# Patient Record
Sex: FEMALE | Race: WHITE | Employment: FULL TIME | URBAN - METROPOLITAN AREA
[De-identification: names, ages, dates, MRNs, and addresses within clinical notes are randomized per-mention and may not be internally consistent; named-entity substitution may affect disease eponyms.]

---

## 2018-03-23 ENCOUNTER — HOSPITAL ENCOUNTER (OUTPATIENT)
Age: 48
Discharge: HOME | End: 2018-03-25

## 2018-03-23 PROCEDURE — 87340 HEPATITIS B SURFACE AG IA: CPT

## 2018-03-23 PROCEDURE — 86706 HEP B SURFACE ANTIBODY: CPT

## 2018-03-26 LAB
HBV SURFACE AB TITR SER: NORMAL {TITER}
HEPATITIS B SURFACE ANTIGEN INTERPRETATION: NORMAL

## 2018-10-16 ENCOUNTER — APPOINTMENT (OUTPATIENT)
Dept: CT IMAGING | Age: 48
DRG: 066 | End: 2018-10-16
Payer: COMMERCIAL

## 2018-10-16 ENCOUNTER — HOSPITAL ENCOUNTER (INPATIENT)
Age: 48
LOS: 3 days | Discharge: HOME HEALTH | DRG: 066 | End: 2018-10-20
Attending: EMERGENCY MEDICINE | Admitting: INTERNAL MEDICINE
Payer: COMMERCIAL

## 2018-10-16 DIAGNOSIS — R27.0 ATAXIA: Primary | ICD-10-CM

## 2018-10-16 DIAGNOSIS — R42 DIZZINESS: ICD-10-CM

## 2018-10-16 LAB
ANION GAP SERPL CALCULATED.3IONS-SCNC: 11 MMOL/L (ref 7–16)
APTT: 36.2 SEC (ref 24.5–35.1)
BACTERIA: ABNORMAL /HPF
BASOPHILS ABSOLUTE: 0.06 E9/L (ref 0–0.2)
BASOPHILS RELATIVE PERCENT: 0.6 % (ref 0–2)
BILIRUBIN URINE: NEGATIVE
BLOOD, URINE: NEGATIVE
BUN BLDV-MCNC: 13 MG/DL (ref 6–20)
CALCIUM SERPL-MCNC: 9.4 MG/DL (ref 8.6–10.2)
CHLORIDE BLD-SCNC: 101 MMOL/L (ref 98–107)
CLARITY: CLEAR
CO2: 25 MMOL/L (ref 22–29)
COLOR: YELLOW
CREAT SERPL-MCNC: 0.8 MG/DL (ref 0.5–1)
EKG ATRIAL RATE: 92 BPM
EKG P AXIS: 43 DEGREES
EKG P-R INTERVAL: 148 MS
EKG Q-T INTERVAL: 330 MS
EKG QRS DURATION: 64 MS
EKG QTC CALCULATION (BAZETT): 408 MS
EKG R AXIS: 29 DEGREES
EKG T AXIS: 37 DEGREES
EKG VENTRICULAR RATE: 92 BPM
EOSINOPHILS ABSOLUTE: 0.08 E9/L (ref 0.05–0.5)
EOSINOPHILS RELATIVE PERCENT: 0.9 % (ref 0–6)
EPITHELIAL CELLS, UA: ABNORMAL /HPF
GFR AFRICAN AMERICAN: >60
GFR NON-AFRICAN AMERICAN: >60 ML/MIN/1.73
GLUCOSE BLD-MCNC: 91 MG/DL (ref 74–109)
GLUCOSE URINE: NEGATIVE MG/DL
HCT VFR BLD CALC: 42.2 % (ref 34–48)
HEMOGLOBIN: 13.6 G/DL (ref 11.5–15.5)
IMMATURE GRANULOCYTES #: 0.03 E9/L
IMMATURE GRANULOCYTES %: 0.3 % (ref 0–5)
INR BLD: 0.9
KETONES, URINE: NEGATIVE MG/DL
LEUKOCYTE ESTERASE, URINE: ABNORMAL
LYMPHOCYTES ABSOLUTE: 2.6 E9/L (ref 1.5–4)
LYMPHOCYTES RELATIVE PERCENT: 27.7 % (ref 20–42)
MCH RBC QN AUTO: 29.6 PG (ref 26–35)
MCHC RBC AUTO-ENTMCNC: 32.2 % (ref 32–34.5)
MCV RBC AUTO: 91.9 FL (ref 80–99.9)
MONOCYTES ABSOLUTE: 0.61 E9/L (ref 0.1–0.95)
MONOCYTES RELATIVE PERCENT: 6.5 % (ref 2–12)
NEUTROPHILS ABSOLUTE: 6.01 E9/L (ref 1.8–7.3)
NEUTROPHILS RELATIVE PERCENT: 64 % (ref 43–80)
NITRITE, URINE: NEGATIVE
PDW BLD-RTO: 12.8 FL (ref 11.5–15)
PH UA: 5.5 (ref 5–9)
PLATELET # BLD: 356 E9/L (ref 130–450)
PMV BLD AUTO: 9.1 FL (ref 7–12)
POTASSIUM SERPL-SCNC: 4.3 MMOL/L (ref 3.5–5)
PROTEIN UA: NEGATIVE MG/DL
PROTHROMBIN TIME: 10.5 SEC (ref 9.3–12.4)
RBC # BLD: 4.59 E12/L (ref 3.5–5.5)
RBC UA: ABNORMAL /HPF (ref 0–2)
SODIUM BLD-SCNC: 137 MMOL/L (ref 132–146)
SPECIFIC GRAVITY UA: 1.02 (ref 1–1.03)
TROPONIN: <0.01 NG/ML (ref 0–0.03)
UROBILINOGEN, URINE: 0.2 E.U./DL
WBC # BLD: 9.4 E9/L (ref 4.5–11.5)
WBC UA: ABNORMAL /HPF (ref 0–5)

## 2018-10-16 PROCEDURE — 99285 EMERGENCY DEPT VISIT HI MDM: CPT

## 2018-10-16 PROCEDURE — 36415 COLL VENOUS BLD VENIPUNCTURE: CPT

## 2018-10-16 PROCEDURE — 70450 CT HEAD/BRAIN W/O DYE: CPT

## 2018-10-16 PROCEDURE — 81001 URINALYSIS AUTO W/SCOPE: CPT

## 2018-10-16 PROCEDURE — 6370000000 HC RX 637 (ALT 250 FOR IP): Performed by: EMERGENCY MEDICINE

## 2018-10-16 PROCEDURE — G0378 HOSPITAL OBSERVATION PER HR: HCPCS

## 2018-10-16 PROCEDURE — 84484 ASSAY OF TROPONIN QUANT: CPT

## 2018-10-16 PROCEDURE — 85730 THROMBOPLASTIN TIME PARTIAL: CPT

## 2018-10-16 PROCEDURE — 85025 COMPLETE CBC W/AUTO DIFF WBC: CPT

## 2018-10-16 PROCEDURE — 80048 BASIC METABOLIC PNL TOTAL CA: CPT

## 2018-10-16 PROCEDURE — 85610 PROTHROMBIN TIME: CPT

## 2018-10-16 RX ORDER — MECLIZINE HYDROCHLORIDE 25 MG/1
25 TABLET ORAL 3 TIMES DAILY PRN
COMMUNITY

## 2018-10-16 RX ORDER — SODIUM CHLORIDE 0.9 % (FLUSH) 0.9 %
10 SYRINGE (ML) INJECTION EVERY 12 HOURS SCHEDULED
Status: DISCONTINUED | OUTPATIENT
Start: 2018-10-17 | End: 2018-10-20 | Stop reason: HOSPADM

## 2018-10-16 RX ORDER — PREGABALIN 100 MG/1
100 CAPSULE ORAL 3 TIMES DAILY
COMMUNITY
End: 2021-03-08

## 2018-10-16 RX ORDER — MECLIZINE HCL 12.5 MG/1
50 TABLET ORAL ONCE
Status: COMPLETED | OUTPATIENT
Start: 2018-10-16 | End: 2018-10-16

## 2018-10-16 RX ORDER — ACETAMINOPHEN 325 MG/1
650 TABLET ORAL EVERY 4 HOURS PRN
Status: DISCONTINUED | OUTPATIENT
Start: 2018-10-16 | End: 2018-10-20 | Stop reason: HOSPADM

## 2018-10-16 RX ORDER — SODIUM CHLORIDE 0.9 % (FLUSH) 0.9 %
10 SYRINGE (ML) INJECTION EVERY 12 HOURS SCHEDULED
Status: DISCONTINUED | OUTPATIENT
Start: 2018-10-16 | End: 2018-10-16

## 2018-10-16 RX ORDER — ONDANSETRON 2 MG/ML
4 INJECTION INTRAMUSCULAR; INTRAVENOUS EVERY 6 HOURS PRN
Status: DISCONTINUED | OUTPATIENT
Start: 2018-10-16 | End: 2018-10-20 | Stop reason: HOSPADM

## 2018-10-16 RX ORDER — SODIUM CHLORIDE 0.9 % (FLUSH) 0.9 %
10 SYRINGE (ML) INJECTION PRN
Status: DISCONTINUED | OUTPATIENT
Start: 2018-10-16 | End: 2018-10-16

## 2018-10-16 RX ORDER — SODIUM CHLORIDE 0.9 % (FLUSH) 0.9 %
10 SYRINGE (ML) INJECTION PRN
Status: DISCONTINUED | OUTPATIENT
Start: 2018-10-16 | End: 2018-10-20 | Stop reason: HOSPADM

## 2018-10-16 RX ORDER — PREGABALIN 100 MG/1
100 CAPSULE ORAL 3 TIMES DAILY
Status: DISCONTINUED | OUTPATIENT
Start: 2018-10-17 | End: 2018-10-20 | Stop reason: HOSPADM

## 2018-10-16 RX ORDER — MECLIZINE HCL 12.5 MG/1
25 TABLET ORAL 3 TIMES DAILY PRN
Status: DISCONTINUED | OUTPATIENT
Start: 2018-10-16 | End: 2018-10-20 | Stop reason: HOSPADM

## 2018-10-16 RX ADMIN — MECLIZINE 50 MG: 12.5 TABLET ORAL at 17:59

## 2018-10-16 ASSESSMENT — ENCOUNTER SYMPTOMS
NAUSEA: 0
FACIAL SWELLING: 0
COUGH: 0
SHORTNESS OF BREATH: 0
ABDOMINAL DISTENTION: 0
ABDOMINAL PAIN: 0
WHEEZING: 0
SORE THROAT: 0
DIARRHEA: 0
CHEST TIGHTNESS: 0
VOMITING: 0
PHOTOPHOBIA: 0
SINUS PRESSURE: 0
BACK PAIN: 0

## 2018-10-16 NOTE — ED PROVIDER NOTES
Chief complaint:  Dizzy, gait problem    HPI History provided by the patient and family  Patient has had about a week of gait difficulty, feeling off balance with some stumbling. Feels dizzy. No lightheadedness or syncope. No headache. Has chronic right facial numbness and weakness from a brain tumor and surgery that she had years ago. No particular headache now. No chest pain, palpitations or shortness of breath. States that she was seen and worked up at [facility] for the syncopal days ago including CT head that was negative however her symptoms continue to worsen. She does state that she gets increasing symptoms with rapid position changes, turning and head movements. Denies any new extremity numbness, tingle, paresthesias or weakness. She apparently has had some intermittent speech difficulty for the last week but nothing persistent. Has chronic right facial droop which is unchanged. No treatment prior to arrival. Apparently has had some mild viral symptoms with diarrhea and congestion for the last week as well. Review of Systems   Constitutional: Negative for chills, diaphoresis, fatigue and fever. HENT: Negative for congestion, ear discharge, ear pain, facial swelling, sinus pressure and sore throat. Eyes: Negative for photophobia and visual disturbance. Respiratory: Negative for cough, chest tightness, shortness of breath and wheezing. Cardiovascular: Negative for chest pain, palpitations and leg swelling. Gastrointestinal: Negative for abdominal distention, abdominal pain, diarrhea, nausea and vomiting. Genitourinary: Negative for dysuria, flank pain and frequency. Musculoskeletal: Positive for gait problem. Negative for arthralgias, back pain, joint swelling, myalgias, neck pain and neck stiffness. Skin: Negative for rash and wound. Neurological: Positive for dizziness, facial asymmetry, speech difficulty and numbness.  Negative for tremors, seizures, syncope, weakness, activity. Gait abnormal. Coordination normal. GCS eye subscore is 4. GCS verbal subscore is 5. GCS motor subscore is 6. Unsteady, short, shuffling gait. Right facial droop with diminished sensation to the right face noted. Arms and legs otherwise with no focal neurologic deficit. Patient oriented ×3. Speech intermittently slightly slurred during the exam. Otherwise all neurologic deficits appear chronic per the family other than the difficulty ambulating. Skin: Skin is warm and dry. She is not diaphoretic. Nursing note and vitals reviewed. Procedures    MDM    ED Course as of Oct 16 1823   Tue Oct 16, 2018   1816 Case discussed with Dr. Karl Nogueira, detailed overview given, he will admit the patient if we have neurology coverage at [address] [place] [address] 300 this week. If not he recommends transferring the patient. [NC]      ED Course User Index  [NC] Elizabeth Dai DO     EKG Interpretation    Interpreted by emergency department physician    Rhythm: normal sinus   Rate: 92  Axis: normal  Ectopy: none  Conduction: normal  ST Segments: nonspecific changes  T Waves: no acute change  Q Waves: none    Clinical Impression: no acute changes    Elizabeth Dai      ED Course as of Oct 16 1823   Tue Oct 16, 2018   1816 Case discussed with Dr. Karl Nogueira, detailed overview given, he will admit the patient if we have neurology coverage at [address] [place] [address],[facility] 300 this week. If not he recommends transferring the patient. [NC]      ED Course User Index  [NC] Elizabeth Dai DO       --------------------------------------------- PAST HISTORY ---------------------------------------------  Past Medical History:  has a past medical history of Fibromyalgia. Past Surgical History:  has a past surgical history that includes brain surgery. Social History:      Family History: family history is not on file. The patients home medications have been reviewed.     Allergies: Patient has no allergy information on

## 2018-10-17 PROCEDURE — 97161 PT EVAL LOW COMPLEX 20 MIN: CPT | Performed by: PHYSICAL THERAPIST

## 2018-10-17 PROCEDURE — 97530 THERAPEUTIC ACTIVITIES: CPT

## 2018-10-17 PROCEDURE — 96372 THER/PROPH/DIAG INJ SC/IM: CPT

## 2018-10-17 PROCEDURE — G8988 SELF CARE GOAL STATUS: HCPCS

## 2018-10-17 PROCEDURE — 97165 OT EVAL LOW COMPLEX 30 MIN: CPT

## 2018-10-17 PROCEDURE — G0378 HOSPITAL OBSERVATION PER HR: HCPCS

## 2018-10-17 PROCEDURE — G8979 MOBILITY GOAL STATUS: HCPCS | Performed by: PHYSICAL THERAPIST

## 2018-10-17 PROCEDURE — 97535 SELF CARE MNGMENT TRAINING: CPT

## 2018-10-17 PROCEDURE — 97530 THERAPEUTIC ACTIVITIES: CPT | Performed by: PHYSICAL THERAPIST

## 2018-10-17 PROCEDURE — 6360000002 HC RX W HCPCS: Performed by: INTERNAL MEDICINE

## 2018-10-17 PROCEDURE — G8978 MOBILITY CURRENT STATUS: HCPCS | Performed by: PHYSICAL THERAPIST

## 2018-10-17 PROCEDURE — G8987 SELF CARE CURRENT STATUS: HCPCS

## 2018-10-17 PROCEDURE — 2580000003 HC RX 258: Performed by: INTERNAL MEDICINE

## 2018-10-17 PROCEDURE — 6370000000 HC RX 637 (ALT 250 FOR IP): Performed by: INTERNAL MEDICINE

## 2018-10-17 RX ADMIN — Medication 10 ML: at 10:46

## 2018-10-17 RX ADMIN — PREGABALIN 100 MG: 100 CAPSULE ORAL at 22:23

## 2018-10-17 RX ADMIN — PREGABALIN 100 MG: 100 CAPSULE ORAL at 15:02

## 2018-10-17 RX ADMIN — PREGABALIN 100 MG: 100 CAPSULE ORAL at 10:45

## 2018-10-17 RX ADMIN — Medication 10 ML: at 00:38

## 2018-10-17 RX ADMIN — Medication 10 ML: at 22:22

## 2018-10-17 RX ADMIN — PREGABALIN 100 MG: 100 CAPSULE ORAL at 00:37

## 2018-10-17 RX ADMIN — ENOXAPARIN SODIUM 40 MG: 40 INJECTION SUBCUTANEOUS at 10:45

## 2018-10-17 ASSESSMENT — PAIN SCALES - GENERAL
PAINLEVEL_OUTOF10: 0
PAINLEVEL_OUTOF10: 0

## 2018-10-17 NOTE — CARE COORDINATION
SS Note/Discharge plan:  Met with pt regarding her transition of care needs and reviewed therapy recommendations for acute rehab placement, pt currently a&o and pleasant, pt relays no past rehab placements, pt has [employer], acute rehab choices discussed and pt prefers [facility], referral made to [facility] admissions liaison and transfer pending acceptance and insurance authorization, sw will follow.  Electronically signed by HEYDI Izquierdo on 10/17/2018 at 2:07 PM

## 2018-10-17 NOTE — H&P
activity: Not on file     Other Topics Concern    Not on file     Social History Narrative    No narrative on file     Prior to Admission medications    Medication Sig Start Date End Date Taking? Authorizing Provider   pregabalin (LYRICA) 100 MG capsule Take 100 mg by mouth 3 times daily. .   Yes Historical Provider, MD   meclizine (ANTIVERT) 25 MG tablet Take 25 mg by mouth 3 times daily as needed (vertigo)   Yes Historical Provider, MD     Allergies: Bactrim [sulfamethoxazole-trimethoprim]    Review of Systems  All bolded are positive; please see HPI  General:  Fever, chills, diaphoresis, fatigue, malaise, night sweats, weight loss  Psychological:  Anxiety, disorientation, hallucinations. ENT:  Epistaxis, vertigo, visual changes. Cardiovascular:  Chest pain, irregular heartbeats, palpitations, paroxysmal nocturnal dyspnea. Respiratory:  Shortness of breath, coughing, sputum production, hemoptysis, wheezing, orthopnea.   Gastrointestinal:  Nausea, vomiting, diarrhea, heartburn, constipation, abdominal pain, hematemesis, hematochezia, melena, acholic stools  Genito-Urinary:  Dysuria, urgency, frequency, hematuria  Musculoskeletal:  Joint pain, joint stiffness, joint swelling, muscle pain  Neurology:  Headache, focal neurological deficits - chronic right-sided facial droop and numbness, weakness, numbness, paresthesia  Derm:  Rashes, ulcers, excoriations, bruising  Extremities:  Decreased ROM, peripheral edema, mottling    Physical Examination  BP (!) 142/84   Pulse 88   Temp 98.3 °F (36.8 °C) (Oral)   Resp 16   Wt 238 lb (108 kg)   SpO2 95%   General: patient is awake, alert, and fully oriented; they appear stated age and are cooperative during the examination; they are in no apparent distress and do not exhibit any labored breathing at this time  HEENT: Right-sided facial droop and decreased in sensation  Neck: supple with trachea midline and without obvious JVD or bruit  Cardiac: regular rate and rhythm

## 2018-10-18 ENCOUNTER — APPOINTMENT (OUTPATIENT)
Dept: MRI IMAGING | Age: 48
DRG: 066 | End: 2018-10-18
Payer: COMMERCIAL

## 2018-10-18 PROCEDURE — 97530 THERAPEUTIC ACTIVITIES: CPT

## 2018-10-18 PROCEDURE — 97535 SELF CARE MNGMENT TRAINING: CPT

## 2018-10-18 PROCEDURE — 70553 MRI BRAIN STEM W/O & W/DYE: CPT

## 2018-10-18 PROCEDURE — 2580000003 HC RX 258: Performed by: INTERNAL MEDICINE

## 2018-10-18 PROCEDURE — 97116 GAIT TRAINING THERAPY: CPT

## 2018-10-18 PROCEDURE — 1200000000 HC SEMI PRIVATE

## 2018-10-18 PROCEDURE — 6370000000 HC RX 637 (ALT 250 FOR IP): Performed by: INTERNAL MEDICINE

## 2018-10-18 PROCEDURE — G0378 HOSPITAL OBSERVATION PER HR: HCPCS

## 2018-10-18 PROCEDURE — 96372 THER/PROPH/DIAG INJ SC/IM: CPT

## 2018-10-18 PROCEDURE — A9577 INJ MULTIHANCE: HCPCS | Performed by: RADIOLOGY

## 2018-10-18 PROCEDURE — 6360000002 HC RX W HCPCS: Performed by: INTERNAL MEDICINE

## 2018-10-18 PROCEDURE — 6360000004 HC RX CONTRAST MEDICATION: Performed by: RADIOLOGY

## 2018-10-18 RX ORDER — CLOPIDOGREL BISULFATE 75 MG/1
75 TABLET ORAL DAILY
Status: DISCONTINUED | OUTPATIENT
Start: 2018-10-18 | End: 2018-10-20 | Stop reason: HOSPADM

## 2018-10-18 RX ADMIN — Medication 10 ML: at 21:04

## 2018-10-18 RX ADMIN — PREGABALIN 100 MG: 100 CAPSULE ORAL at 21:03

## 2018-10-18 RX ADMIN — CLOPIDOGREL BISULFATE 75 MG: 75 TABLET ORAL at 15:54

## 2018-10-18 RX ADMIN — PREGABALIN 100 MG: 100 CAPSULE ORAL at 08:52

## 2018-10-18 RX ADMIN — Medication 10 ML: at 08:52

## 2018-10-18 RX ADMIN — GADOBENATE DIMEGLUMINE 20 ML: 529 INJECTION, SOLUTION INTRAVENOUS at 13:39

## 2018-10-18 RX ADMIN — PREGABALIN 100 MG: 100 CAPSULE ORAL at 13:56

## 2018-10-18 RX ADMIN — ENOXAPARIN SODIUM 40 MG: 40 INJECTION SUBCUTANEOUS at 08:52

## 2018-10-18 ASSESSMENT — PAIN SCALES - GENERAL
PAINLEVEL_OUTOF10: 0

## 2018-10-18 NOTE — PROGRESS NOTES
Occupational Therapy  O.T. Bedside Treatment Note    Date:10/18/2018  Patient Name: Florencia Iqbal  MRN: [id number]  : 1970  ROOM #: [id number]    Problem list / Diagnosis:   Patient Active Problem List   Diagnosis    Ataxia    [facility] tumor ([facility]    Fibromyalgia     Past Medical History:   Past Medical History:   Diagnosis Date    [facility] tumor ([facility] [address] [facility]     23years old-not cancerous     Fibromyalgia      Onset/Medical history: medical history reviewed  Past medical history: reviewed    The admitting diagnosis and active problem list as listed above have been reviewed prior to treatment. Nursing cleared the patient for treatment. Patient is agreeable to treatment. Discharge recommendations: Acute vs. HHOT with supervision/assist; followed by OP OT (pt may benefit from vision and speech therapy)   Equipment prescriptions needed: TBD     AM - [facility] Daily Activity Inpatient     AM-PAC Daily Activity Inpatient   How much help for putting on and taking off regular lower body clothing?: A Little  How much help for Bathing?: A Little  How much help for Toileting?: A Little  How much help for putting on and taking off regular upper body clothing?: A Little  How much help for taking care of personal grooming?: A Little  How much help for eating meals?: None  AM-[facility] Inpatient Daily Activity Raw Score: 19  AM-[facility] Inpatient ADL T-Scale Score : 40.22  ADL Inpatient CMS 0-100% Score: 42.8  ADL Inpatient CMS G-Code Modifier : CK    Precautions: falls, hx of brain tumor, Fibromyalgia, decreased fine motor coordination     S:  - Pt cleared for treatment through nursing.  - Pain: pt had no c/o pain at this time. - Pt c/o \"blurry vision\"; pt also states her \"speech and voice sounds different\"; pt c/o weakness on R side of body. - Pt pleasant and cooperative during session.     O:    Function Assessment     Status  Goal Comment   Feeding:  Independent   Independent   Per last report   Grooming:  Minimal Assist at sink level Independent   While standing sinkside to wash hands after toileting; pt able to manage containers and complete oral hygiene while standing sinkside; assist for standing balance while combing hair at sinkside    UE dressing:  Minimal Assist  Independent  Per last report   LE dressing:  Minimal Assist  Independent  Pt able to don/doff socks while seated EOB; pt crossed BLE's during LE dressing; pt required increased time to don R sock d/t weakness    Bathing:  Minimal Assist  Independent  Per last report   Bed Mobility:       Supervision  for supine to sit,   Supervision  for scooting,  Supervision  for sit to supine  Independent  Pt able to bring UB and LE's to/from EOB   Functional Transfers:    Minimal Assist for sit to stand transfer from EOB; transfer to/from toilet at supervision; transfer to EOB at supervision with no AD Independent  Safety: fair   Cuing for hand placement   Functional Mobility: 20 feet x 2 with hand held assist/Minimal Assist  Independent  Unsteadiness noted; pt may benefit from use of FWW      - BUE digit AROM exercises: 10 reps in digit flexion/extension, abduction/adduction and digit opposition. [facility]. Fair activity tolerance noted during ex's. - Fine motor activity: pt able to use R hand to print and write name; pt states that her handwriting is similar prior to session. Pt also able to read a few sentences in book without any difficulty with reading, although c/o blurriness in vision.     A:  -Balance: Sitting: fair EOB        Standing: fair/fair - with Min A with HHA; unsteadiness noted  -Endurance: fair - (2* to decreased endurance, strength, fatigue)  -Edema: none  -Safety: fair (VC's for  safety, sequencing, hand placement)  - Pt/family education/training: bed mobility, transfer training, functional mobility,  LE dressing,   toileting/hygiene, sequencing, hand placement, safety, BUE digit ROM ex's, ECT's, grooming, ADL retraining, fine motor activities  -Pt would benefit from additional ADLs, safety education, balance activities, transfer training, strength exercises, and over all endurance building.     P:  - Plan of care: Patient will be seen by OT 1-3x/wk for therapeutic activity, ADL re-training, bed mobility,functional transfers, functional mobility, safety and fall prevention, balance and endurance activities, instruction and training in energy conservation principles, and   patient and family education.   - Patient and/or family understands diagnosis, prognosis and plan of care: yes     Treatment minutes: [address], [address] Elizabeth [address]

## 2018-10-18 NOTE — PROGRESS NOTES
Patient seen and examined. Patient still has some ataxia but her strength is improving. The case was discussed with physical therapist at the bedside. No complaints of unusual SOB, nausea, vomiting, chest pain,abd. pain, dizziness, diarrhea or constipation.     /70   Pulse 78   Temp 98 °F (36.7 °C) (Oral)   Resp 18   Wt 238 lb (108 kg)   SpO2 98%     HEENT: negative to gross visual examination  Heart: regular rate and rhythm  Lungs: clear  Abdomen: soft, positive bowel sounds, + obese, no hepatosplenomegaly, no guarding, rebound, rigidity  Ext: no clubbing, cyanosis, erythema, edema  Neuro: alert and oriented X 3, + ataxia    CBC with Differential:    Lab Results   Component Value Date    WBC 9.4 10/16/2018    RBC 4.59 10/16/2018    HGB 13.6 10/16/2018    HCT 42.2 10/16/2018     10/16/2018    MCV 91.9 10/16/2018    MCH 29.6 10/16/2018    MCHC 32.2 10/16/2018    RDW 12.8 10/16/2018    LYMPHOPCT 27.7 10/16/2018    MONOPCT 6.5 10/16/2018    BASOPCT 0.6 10/16/2018    MONOSABS 0.61 10/16/2018    LYMPHSABS 2.60 10/16/2018    EOSABS 0.08 10/16/2018    BASOSABS 0.06 10/16/2018     CMP:    Lab Results   Component Value Date     10/16/2018    K 4.3 10/16/2018     10/16/2018    CO2 25 10/16/2018    BUN 13 10/16/2018    CREATININE 0.8 10/16/2018    GFRAA >60 10/16/2018    LABGLOM >60 10/16/2018    GLUCOSE 91 10/16/2018    GLUCOSE 107 05/03/2012    PROT 7.7 04/01/2017    LABALBU 4.5 04/01/2017    LABALBU 4.7 05/03/2012    CALCIUM 9.4 10/16/2018    BILITOT 0.4 04/01/2017    ALKPHOS 86 04/01/2017    AST 21 04/01/2017    ALT 32 04/01/2017     Magnesium:  No results found for: MG  Phosphorus:  No results found for: PHOS  PT/INR:    Lab Results   Component Value Date    PROTIME 10.5 10/16/2018    INR 0.9 10/16/2018     Troponin:    Lab Results   Component Value Date    TROPONINI <0.01 10/16/2018     U/A:    Lab Results   Component Value Date    COLORU Yellow 10/16/2018    PROTEINU Negative 10/16/2018 PHUR 5.5 10/16/2018    WBCUA 1-3 10/16/2018    RBCUA NONE 10/16/2018    BACTERIA FEW 10/16/2018    CLARITYU Clear 10/16/2018    SPECGRAV 1.025 10/16/2018    LEUKOCYTESUR SMALL 10/16/2018    UROBILINOGEN 0.2 10/16/2018    BILIRUBINUR Negative 10/16/2018    BLOODU Negative 10/16/2018    GLUCOSEU Negative 10/16/2018     HgBA1c:  No results found for: LABA1C  FLP:    Lab Results   Component Value Date    TRIG 105 04/01/2017    HDL 50 04/01/2017    LDLCALC 123 04/01/2017    LABVLDL 21 04/01/2017     TSH:    Lab Results   Component Value Date    TSH 2.290 04/01/2017     LIPASE:  No results found for: LIPASE    No results for input(s): GLUMET in the last 72 hours. CT Head WO Contrast   Final Result   1. No acute intracranial hemorrhage or mass effect. 2. Postsurgical changes from prior right frontoparietal temporal   craniotomy with area of encephalomalacia within the right anterior   temporal lobe. MRI Brain W Contrast    (Results Pending)        pregabalin  100 mg Oral TID    sodium chloride flush  10 mL Intravenous 2 times per day    enoxaparin  40 mg Subcutaneous Daily        Assessment; Active Problems:    Ataxia    Brain tumor - noncancerous about 1995    Fibromyalgia      Plan:  Records from [facility] were reviewed  MRI of the head with contrast today  Neurology has not seen the patient yet    See orders.         Ezequiel Luevano DO  11:37 AM  10/18/2018

## 2018-10-18 NOTE — PROGRESS NOTES
Physical Therapy  Physical Therapy  Daily Treatment Note  10/18/2018  [id number]                      Candie Ortiz   [id number]                              1970    Patient Active Problem List   Diagnosis    Ataxia    Brain tumor ([facility] [address]    Fibromyalgia       Recommendation for discharge: Acute for balance  Equipment prescriptions needed:to be determined    AM-[facility] Mobility Inpatient   How much difficulty turning over in bed?: None  How much difficulty sitting down on / standing up from a chair with arms?: A Little  How much difficulty moving from lying on back to sitting on side of bed?: None  How much help from another person moving to and from a bed to a chair?: A Little  How much help from another person needed to walk in hospital room?: A Little  How much help from another person for climbing 3-5 steps with a railing?: A Little  AM-[facility] Inpatient Mobility Raw Score : 20  AM-[facility] Inpatient T-Scale Score : 47.67  Mobility Inpatient CMS 0-100% Score: 35.83  Mobility Inpatient CMS G-Code Modifier : CJ      Precautions: falls,ataxia, speak in her L ear, vision deficits per patient    S: Patient cleared by nursing for treatment. Patient is agreeable to treatment. Pain status: (measured on a visual analog scale with 0=no pain and 10=excruciating pain) 0/10. O: Pt was instructed in and performed the following:   Bed Mobility- Supine to sit-Supervision     Scooting- Supervision     Sit to supine-Supervision   Transfers-sit to stand- Minimal assists x 1 for balance     Gait:  Patient ambulated 140 feet x 2 using HHA and environmental support with the other hand with Minimal assists x 1. V/C for maintaining mid-line during ambulation, decreased speed, pacing, and safety. Steps: Pt ascended/descended 14 steps using 2 hand rails with Supervision. V/C for sequencing and safety. Treatment: Pt ambulated in the hallway and performed stair training. Comment: Call light left by patient.   RTB at end of tx session. A: Pt displays an ataxic gait; unsteady at times t/o ambulation and holding onto all available environmental support (hand rails in the hallway, foot board of the bed, walls, door frame) with the other hand. Pt able to perform stairs using 2 hand rails with no LOB or unsteadiness noted. P: Continue with physical therapy   TRAM JOHNSTON, PTA   Goals to be met in 3 days. Bed mobility-Independent  Transfers-Independent  Ambulation-Independent for 75 feet using  supervision   Stairs-up and down 5  step(s) using 1 rail(s) with Supervision   Comments:       Increase strength in affected muscle groups by 1/3 grade  Increase balance to allow for improvement towards functional goals. Increase endurance to allow for improvement towards functional goals.

## 2018-10-19 ENCOUNTER — APPOINTMENT (OUTPATIENT)
Dept: CT IMAGING | Age: 48
DRG: 066 | End: 2018-10-19
Payer: COMMERCIAL

## 2018-10-19 PROBLEM — I63.9 ACUTE CEREBROVASCULAR ACCIDENT (HCC): Status: ACTIVE | Noted: 2018-10-19

## 2018-10-19 LAB
ALBUMIN SERPL-MCNC: 4 G/DL (ref 3.5–5.2)
ALP BLD-CCNC: 96 U/L (ref 35–104)
ALT SERPL-CCNC: 32 U/L (ref 0–32)
ANION GAP SERPL CALCULATED.3IONS-SCNC: 12 MMOL/L (ref 7–16)
AST SERPL-CCNC: 14 U/L (ref 0–31)
BILIRUB SERPL-MCNC: 0.3 MG/DL (ref 0–1.2)
BUN BLDV-MCNC: 9 MG/DL (ref 6–20)
CALCIUM SERPL-MCNC: 9.1 MG/DL (ref 8.6–10.2)
CHLORIDE BLD-SCNC: 102 MMOL/L (ref 98–107)
CHOLESTEROL, TOTAL: 192 MG/DL (ref 0–199)
CO2: 25 MMOL/L (ref 22–29)
CREAT SERPL-MCNC: 0.7 MG/DL (ref 0.5–1)
GFR AFRICAN AMERICAN: >60
GFR NON-AFRICAN AMERICAN: >60 ML/MIN/1.73
GLUCOSE BLD-MCNC: 119 MG/DL (ref 74–109)
HDLC SERPL-MCNC: 43 MG/DL
LDL CHOLESTEROL CALCULATED: 112 MG/DL (ref 0–99)
POTASSIUM SERPL-SCNC: 3.7 MMOL/L (ref 3.5–5)
SODIUM BLD-SCNC: 139 MMOL/L (ref 132–146)
TOTAL PROTEIN: 7.5 G/DL (ref 6.4–8.3)
TRIGL SERPL-MCNC: 184 MG/DL (ref 0–149)
TSH SERPL DL<=0.05 MIU/L-ACNC: 2.17 UIU/ML (ref 0.27–4.2)
VLDLC SERPL CALC-MCNC: 37 MG/DL

## 2018-10-19 PROCEDURE — 6370000000 HC RX 637 (ALT 250 FOR IP): Performed by: INTERNAL MEDICINE

## 2018-10-19 PROCEDURE — 36415 COLL VENOUS BLD VENIPUNCTURE: CPT

## 2018-10-19 PROCEDURE — 93306 TTE W/DOPPLER COMPLETE: CPT

## 2018-10-19 PROCEDURE — 1200000000 HC SEMI PRIVATE

## 2018-10-19 PROCEDURE — 97116 GAIT TRAINING THERAPY: CPT

## 2018-10-19 PROCEDURE — 2580000003 HC RX 258: Performed by: INTERNAL MEDICINE

## 2018-10-19 PROCEDURE — 82746 ASSAY OF FOLIC ACID SERUM: CPT

## 2018-10-19 PROCEDURE — 6360000002 HC RX W HCPCS: Performed by: INTERNAL MEDICINE

## 2018-10-19 PROCEDURE — 70498 CT ANGIOGRAPHY NECK: CPT

## 2018-10-19 PROCEDURE — 82607 VITAMIN B-12: CPT

## 2018-10-19 PROCEDURE — 80053 COMPREHEN METABOLIC PANEL: CPT

## 2018-10-19 PROCEDURE — 80061 LIPID PANEL: CPT

## 2018-10-19 PROCEDURE — 6360000004 HC RX CONTRAST MEDICATION: Performed by: RADIOLOGY

## 2018-10-19 PROCEDURE — 84443 ASSAY THYROID STIM HORMONE: CPT

## 2018-10-19 RX ADMIN — ENOXAPARIN SODIUM 40 MG: 40 INJECTION SUBCUTANEOUS at 12:06

## 2018-10-19 RX ADMIN — CLOPIDOGREL BISULFATE 75 MG: 75 TABLET ORAL at 12:09

## 2018-10-19 RX ADMIN — PREGABALIN 100 MG: 100 CAPSULE ORAL at 13:05

## 2018-10-19 RX ADMIN — IOPAMIDOL 80 ML: 755 INJECTION, SOLUTION INTRAVENOUS at 10:22

## 2018-10-19 RX ADMIN — Medication 10 ML: at 12:10

## 2018-10-19 RX ADMIN — ACETAMINOPHEN 650 MG: 325 TABLET, FILM COATED ORAL at 19:14

## 2018-10-19 RX ADMIN — PREGABALIN 100 MG: 100 CAPSULE ORAL at 21:37

## 2018-10-19 RX ADMIN — Medication 10 ML: at 21:38

## 2018-10-19 ASSESSMENT — PAIN SCALES - GENERAL: PAINLEVEL_OUTOF10: 7

## 2018-10-19 NOTE — PROGRESS NOTES
Patient seen and examined. MRI results were reviewed with the patient. Patient still has ataxia and is unchanged. The case was discussed with physical therapist at the bedside and the patient has very poor balance while walking in the hallway. No complaints of unusual SOB, nausea, vomiting, chest pain,abd. pain, dizziness, diarrhea or constipation.     /76   Pulse 98   Temp 99 °F (37.2 °C) (Oral)   Resp 18   Wt 238 lb (108 kg)   SpO2 97%     HEENT: negative to gross visual examination  Heart: regular rate and rhythm  Lungs: clear  Abdomen: soft, positive bowel sounds, + obese, no hepatosplenomegaly, no guarding, rebound, rigidity  Ext: no clubbing, cyanosis, erythema, edema  Neuro: alert and oriented X 3, + ataxia    CBC with Differential:    Lab Results   Component Value Date    WBC 9.4 10/16/2018    RBC 4.59 10/16/2018    HGB 13.6 10/16/2018    HCT 42.2 10/16/2018     10/16/2018    MCV 91.9 10/16/2018    MCH 29.6 10/16/2018    MCHC 32.2 10/16/2018    RDW 12.8 10/16/2018    LYMPHOPCT 27.7 10/16/2018    MONOPCT 6.5 10/16/2018    BASOPCT 0.6 10/16/2018    MONOSABS 0.61 10/16/2018    LYMPHSABS 2.60 10/16/2018    EOSABS 0.08 10/16/2018    BASOSABS 0.06 10/16/2018     CMP:    Lab Results   Component Value Date     10/16/2018    K 4.3 10/16/2018     10/16/2018    CO2 25 10/16/2018    BUN 13 10/16/2018    CREATININE 0.8 10/16/2018    GFRAA >60 10/16/2018    LABGLOM >60 10/16/2018    GLUCOSE 91 10/16/2018    GLUCOSE 107 05/03/2012    PROT 7.7 04/01/2017    LABALBU 4.5 04/01/2017    LABALBU 4.7 05/03/2012    CALCIUM 9.4 10/16/2018    BILITOT 0.4 04/01/2017    ALKPHOS 86 04/01/2017    AST 21 04/01/2017    ALT 32 04/01/2017     Magnesium:  No results found for: MG  Phosphorus:  No results found for: PHOS  PT/INR:    Lab Results   Component Value Date    PROTIME 10.5 10/16/2018    INR 0.9 10/16/2018     Troponin:    Lab Results   Component Value Date    TROPONINI <0.01 10/16/2018     U/A:

## 2018-10-19 NOTE — CARE COORDINATION
SS Note/ Discharge plan:  Received consult for [facility] to reassess pt for acute rehab admission due to results of her MRI and new CVA diagnosis, notified [facility] admissions for [facility] of consult and she will submit for insurance [id number] [facility] today 32/61, pt and nursing notified.  Electronically signed by HEYDI Keating on 10/19/2018 at 11:02 AM

## 2018-10-20 VITALS
HEIGHT: 65 IN | HEART RATE: 76 BPM | SYSTOLIC BLOOD PRESSURE: 102 MMHG | OXYGEN SATURATION: 99 % | TEMPERATURE: 98.4 F | BODY MASS INDEX: 39.65 KG/M2 | DIASTOLIC BLOOD PRESSURE: 76 MMHG | RESPIRATION RATE: 16 BRPM | WEIGHT: 238 LBS

## 2018-10-20 LAB
FOLATE: >20 NG/ML (ref 4.8–24.2)
VITAMIN B-12: 342 PG/ML (ref 211–946)

## 2018-10-20 PROCEDURE — 6370000000 HC RX 637 (ALT 250 FOR IP): Performed by: INTERNAL MEDICINE

## 2018-10-20 PROCEDURE — 2580000003 HC RX 258: Performed by: INTERNAL MEDICINE

## 2018-10-20 PROCEDURE — 6360000002 HC RX W HCPCS: Performed by: INTERNAL MEDICINE

## 2018-10-20 RX ORDER — CLOPIDOGREL BISULFATE 75 MG/1
75 TABLET ORAL DAILY
Qty: 30 TABLET | Refills: 3 | Status: SHIPPED | OUTPATIENT
Start: 2018-10-21 | End: 2021-03-08

## 2018-10-20 RX ADMIN — PREGABALIN 100 MG: 100 CAPSULE ORAL at 09:14

## 2018-10-20 RX ADMIN — ENOXAPARIN SODIUM 40 MG: 40 INJECTION SUBCUTANEOUS at 09:14

## 2018-10-20 RX ADMIN — Medication 10 ML: at 09:15

## 2018-10-20 RX ADMIN — CLOPIDOGREL BISULFATE 75 MG: 75 TABLET ORAL at 09:14

## 2018-10-20 ASSESSMENT — PAIN SCALES - GENERAL
PAINLEVEL_OUTOF10: 0
PAINLEVEL_OUTOF10: 0

## 2018-10-20 NOTE — DISCHARGE SUMMARY
cerebellar vermis and superior cerebellum with slight heterogeneous   enhancement, compatible with early subacute infarcts. 2. Postsurgical changes from prior right frontoparietotemporal   craniotomy with subadjacent area of encephalitis are within the right   anterior temporal lobe. These findings were communicated to radiology department personnel by   critical results form for further communication to the requesting   physician at the conclusion of this examination, per hospital   protocol. CT Head WO Contrast   Final Result   1. No acute intracranial hemorrhage or mass effect. 2. Postsurgical changes from prior right frontoparietal temporal   craniotomy with area of encephalomalacia within the right anterior   temporal lobe. Disposition  The patient's condition is fair. At this time the patient is without objective evidence of an acute process requiring continuing hospitalization or inpatient management. They are stable for discharge with outpatient follow-up. I have spoken with the patient and discussed the results of the current hospitalization, in addition to providing specific details for the plan of care and counseling regarding the diagnosis and prognosis. The plan has been discussed in detail and they are aware of the specific conditions for emergent return, as well as the importance of follow-up. Their questions are answered at this time and they are agreeable with the plan for discharge to home    Discharge Medications     Medication List      START taking these medications    clopidogrel 75 MG tablet  Commonly known as:  PLAVIX  Take 1 tablet by mouth daily  Start taking on:  10/21/2018        CHANGE how you take these medications    meclizine 25 MG tablet  Commonly known as:  ANTIVERT  What changed:  Another medication with the same name was removed. Continue taking this medication, and follow the directions you see here.      pregabalin 100 MG capsule  Commonly

## 2018-11-20 ENCOUNTER — HOSPITAL ENCOUNTER (OUTPATIENT)
Dept: AUDIOLOGY | Age: 48
Discharge: HOME | End: 2018-11-20
Payer: COMMERCIAL

## 2018-11-20 PROCEDURE — 9990000010 HC NO CHARGE VISIT: Performed by: AUDIOLOGIST

## 2018-12-20 ENCOUNTER — HOSPITAL ENCOUNTER (OUTPATIENT)
Dept: MAMMOGRAPHY | Age: 48
Discharge: HOME | End: 2018-12-22
Payer: COMMERCIAL

## 2018-12-20 ENCOUNTER — HOSPITAL ENCOUNTER (OUTPATIENT)
Dept: CT IMAGING | Age: 48
Discharge: HOME | End: 2018-12-20
Payer: COMMERCIAL

## 2018-12-20 DIAGNOSIS — Z12.39 BREAST CANCER SCREENING: ICD-10-CM

## 2018-12-20 DIAGNOSIS — R47.81 SLURRED SPEECH: ICD-10-CM

## 2018-12-20 PROCEDURE — 70470 CT HEAD/BRAIN W/O & W/DYE: CPT

## 2018-12-20 PROCEDURE — 77063 BREAST TOMOSYNTHESIS BI: CPT

## 2018-12-20 PROCEDURE — 6360000004 HC RX CONTRAST MEDICATION: Performed by: RADIOLOGY

## 2018-12-20 RX ADMIN — IOPAMIDOL 50 ML: 755 INJECTION, SOLUTION INTRAVENOUS at 08:17

## 2019-01-17 ENCOUNTER — HOSPITAL ENCOUNTER (OUTPATIENT)
Dept: NON INVASIVE DIAGNOSTICS | Age: 49
Discharge: HOME | End: 2019-01-17
Payer: COMMERCIAL

## 2019-03-06 ENCOUNTER — PROCEDURE VISIT (OUTPATIENT)
Dept: AUDIOLOGY | Age: 49
End: 2019-03-06

## 2019-03-06 ENCOUNTER — OFFICE VISIT (OUTPATIENT)
Dept: ENT CLINIC | Age: 49
End: 2019-03-06
Payer: COMMERCIAL

## 2019-03-06 VITALS
WEIGHT: 238 LBS | HEART RATE: 80 BPM | SYSTOLIC BLOOD PRESSURE: 120 MMHG | HEIGHT: 65 IN | BODY MASS INDEX: 39.65 KG/M2 | DIASTOLIC BLOOD PRESSURE: 80 MMHG

## 2019-03-06 DIAGNOSIS — H90.A21 SENSORINEURAL HEARING LOSS, UNILATERAL, RIGHT EAR, WITH RESTRICTED HEARING ON THE CONTRALATERAL SIDE: Primary | ICD-10-CM

## 2019-03-06 DIAGNOSIS — H90.3 SENSORINEURAL HEARING LOSS (SNHL) OF BOTH EARS: Primary | ICD-10-CM

## 2019-03-06 PROCEDURE — G8427 DOCREV CUR MEDS BY ELIG CLIN: HCPCS | Performed by: OTOLARYNGOLOGY

## 2019-03-06 PROCEDURE — G8417 CALC BMI ABV UP PARAM F/U: HCPCS | Performed by: OTOLARYNGOLOGY

## 2019-03-06 PROCEDURE — 99204 OFFICE O/P NEW MOD 45 MIN: CPT | Performed by: OTOLARYNGOLOGY

## 2019-03-06 PROCEDURE — G8484 FLU IMMUNIZE NO ADMIN: HCPCS | Performed by: OTOLARYNGOLOGY

## 2019-03-06 PROCEDURE — 99999 PR OFFICE/OUTPT VISIT,PROCEDURE ONLY: CPT | Performed by: AUDIOLOGIST

## 2019-03-06 PROCEDURE — G8598 ASA/ANTIPLAT THER USED: HCPCS | Performed by: OTOLARYNGOLOGY

## 2019-03-06 PROCEDURE — 1036F TOBACCO NON-USER: CPT | Performed by: OTOLARYNGOLOGY

## 2019-03-06 RX ORDER — DIAZEPAM 5 MG/1
5 TABLET ORAL EVERY 6 HOURS PRN
COMMUNITY

## 2019-03-06 RX ORDER — ATORVASTATIN CALCIUM 40 MG/1
TABLET, FILM COATED ORAL
Refills: 5 | COMMUNITY
Start: 2019-01-29

## 2019-03-14 ENCOUNTER — PROCEDURE VISIT (OUTPATIENT)
Dept: AUDIOLOGY | Age: 49
End: 2019-03-14

## 2019-03-14 DIAGNOSIS — H90.A21 SENSORINEURAL HEARING LOSS, UNILATERAL, RIGHT EAR, WITH RESTRICTED HEARING ON THE CONTRALATERAL SIDE: Primary | ICD-10-CM

## 2019-03-14 PROCEDURE — 99999 PR OFFICE/OUTPT VISIT,PROCEDURE ONLY: CPT | Performed by: AUDIOLOGIST

## 2019-03-18 ENCOUNTER — HOSPITAL ENCOUNTER (OUTPATIENT)
Dept: GENERAL RADIOLOGY | Age: 49
Discharge: HOME | End: 2019-03-20
Payer: COMMERCIAL

## 2019-03-18 DIAGNOSIS — R13.10 DYSPHAGIA, UNSPECIFIED TYPE: ICD-10-CM

## 2019-03-18 PROCEDURE — 74230 X-RAY XM SWLNG FUNCJ C+: CPT

## 2019-03-18 PROCEDURE — 92611 MOTION FLUOROSCOPY/SWALLOW: CPT | Performed by: SPEECH-LANGUAGE PATHOLOGIST

## 2019-03-18 PROCEDURE — 2500000003 HC RX 250 WO HCPCS: Performed by: PSYCHIATRY & NEUROLOGY

## 2019-03-18 PROCEDURE — 92526 ORAL FUNCTION THERAPY: CPT | Performed by: SPEECH-LANGUAGE PATHOLOGIST

## 2019-03-18 RX ADMIN — BARIUM SULFATE 45 G: 0.6 CREAM ORAL at 10:05

## 2019-03-18 RX ADMIN — BARIUM SULFATE 88 G: 960 POWDER, FOR SUSPENSION ORAL at 10:06

## 2019-03-29 ENCOUNTER — PROCEDURE VISIT (OUTPATIENT)
Dept: AUDIOLOGY | Age: 49
End: 2019-03-29

## 2019-03-29 DIAGNOSIS — H90.A21 SENSORINEURAL HEARING LOSS, UNILATERAL, RIGHT EAR, WITH RESTRICTED HEARING ON THE CONTRALATERAL SIDE: Primary | ICD-10-CM

## 2019-03-29 PROCEDURE — 99999 PR OFFICE/OUTPT VISIT,PROCEDURE ONLY: CPT | Performed by: AUDIOLOGIST

## 2019-04-01 ENCOUNTER — PROCEDURE VISIT (OUTPATIENT)
Dept: AUDIOLOGY | Age: 49
End: 2019-04-01

## 2019-04-01 DIAGNOSIS — H90.A21 SENSORINEURAL HEARING LOSS, UNILATERAL, RIGHT EAR, WITH RESTRICTED HEARING ON THE CONTRALATERAL SIDE: Primary | ICD-10-CM

## 2019-04-01 PROCEDURE — 99999 PR OFFICE/OUTPT VISIT,PROCEDURE ONLY: CPT | Performed by: AUDIOLOGIST

## 2019-04-01 NOTE — PROGRESS NOTES
Patient seen walk-in for increase in hearing aid gain. No other issues noted. Reminded patient to call for appointment. 30-day hearing aid check scheduled for 5/6/19.

## 2019-04-09 ENCOUNTER — APPOINTMENT (OUTPATIENT)
Dept: GENERAL RADIOLOGY | Age: 49
End: 2019-04-09
Payer: COMMERCIAL

## 2019-04-09 ENCOUNTER — HOSPITAL ENCOUNTER (EMERGENCY)
Age: 49
Discharge: HOME | End: 2019-04-09
Attending: EMERGENCY MEDICINE
Payer: COMMERCIAL

## 2019-04-09 ENCOUNTER — APPOINTMENT (OUTPATIENT)
Dept: CT IMAGING | Age: 49
End: 2019-04-09
Payer: COMMERCIAL

## 2019-04-09 VITALS
SYSTOLIC BLOOD PRESSURE: 116 MMHG | WEIGHT: 228.25 LBS | RESPIRATION RATE: 16 BRPM | BODY MASS INDEX: 38.03 KG/M2 | OXYGEN SATURATION: 98 % | TEMPERATURE: 97.3 F | HEART RATE: 86 BPM | DIASTOLIC BLOOD PRESSURE: 80 MMHG | HEIGHT: 65 IN

## 2019-04-09 DIAGNOSIS — R41.82 ALTERED MENTAL STATUS, UNSPECIFIED ALTERED MENTAL STATUS TYPE: Primary | ICD-10-CM

## 2019-04-09 LAB
ANION GAP SERPL CALCULATED.3IONS-SCNC: 10 MMOL/L (ref 7–16)
APTT: 34.4 SEC (ref 24.5–35.1)
BACTERIA: ABNORMAL /HPF
BASOPHILS ABSOLUTE: 0.05 E9/L (ref 0–0.2)
BASOPHILS RELATIVE PERCENT: 0.8 % (ref 0–2)
BILIRUBIN URINE: NEGATIVE
BLOOD, URINE: NEGATIVE
BUN BLDV-MCNC: 13 MG/DL (ref 6–20)
CALCIUM SERPL-MCNC: 9.5 MG/DL (ref 8.6–10.2)
CHLORIDE BLD-SCNC: 102 MMOL/L (ref 98–107)
CLARITY: CLEAR
CO2: 27 MMOL/L (ref 22–29)
COLOR: YELLOW
CREAT SERPL-MCNC: 0.8 MG/DL (ref 0.5–1)
EOSINOPHILS ABSOLUTE: 0.09 E9/L (ref 0.05–0.5)
EOSINOPHILS RELATIVE PERCENT: 1.4 % (ref 0–6)
EPITHELIAL CELLS, UA: ABNORMAL /HPF
GFR AFRICAN AMERICAN: >60
GFR NON-AFRICAN AMERICAN: >60 ML/MIN/1.73
GLUCOSE BLD-MCNC: 98 MG/DL (ref 74–99)
GLUCOSE URINE: NEGATIVE MG/DL
HCT VFR BLD CALC: 39.3 % (ref 34–48)
HEMOGLOBIN: 12.8 G/DL (ref 11.5–15.5)
IMMATURE GRANULOCYTES #: 0.01 E9/L
IMMATURE GRANULOCYTES %: 0.2 % (ref 0–5)
INR BLD: 1.1
KETONES, URINE: NEGATIVE MG/DL
LEUKOCYTE ESTERASE, URINE: NEGATIVE
LYMPHOCYTES ABSOLUTE: 1.99 E9/L (ref 1.5–4)
LYMPHOCYTES RELATIVE PERCENT: 31.6 % (ref 20–42)
MCH RBC QN AUTO: 30.1 PG (ref 26–35)
MCHC RBC AUTO-ENTMCNC: 32.6 % (ref 32–34.5)
MCV RBC AUTO: 92.5 FL (ref 80–99.9)
MONOCYTES ABSOLUTE: 0.84 E9/L (ref 0.1–0.95)
MONOCYTES RELATIVE PERCENT: 13.3 % (ref 2–12)
NEUTROPHILS ABSOLUTE: 3.32 E9/L (ref 1.8–7.3)
NEUTROPHILS RELATIVE PERCENT: 52.7 % (ref 43–80)
NITRITE, URINE: NEGATIVE
PDW BLD-RTO: 13.2 FL (ref 11.5–15)
PH UA: 5.5 (ref 5–9)
PLATELET # BLD: 286 E9/L (ref 130–450)
PMV BLD AUTO: 9.5 FL (ref 7–12)
POTASSIUM SERPL-SCNC: 3.7 MMOL/L (ref 3.5–5)
PROTEIN UA: NEGATIVE MG/DL
PROTHROMBIN TIME: 12.1 SEC (ref 9.3–12.4)
RBC # BLD: 4.25 E12/L (ref 3.5–5.5)
RBC UA: ABNORMAL /HPF (ref 0–2)
SODIUM BLD-SCNC: 139 MMOL/L (ref 132–146)
SPECIFIC GRAVITY UA: <=1.005 (ref 1–1.03)
TROPONIN: <0.01 NG/ML (ref 0–0.03)
UROBILINOGEN, URINE: 0.2 E.U./DL
WBC # BLD: 6.3 E9/L (ref 4.5–11.5)
WBC UA: ABNORMAL /HPF (ref 0–5)

## 2019-04-09 PROCEDURE — 84484 ASSAY OF TROPONIN QUANT: CPT

## 2019-04-09 PROCEDURE — 85025 COMPLETE CBC W/AUTO DIFF WBC: CPT

## 2019-04-09 PROCEDURE — 71045 X-RAY EXAM CHEST 1 VIEW: CPT

## 2019-04-09 PROCEDURE — 81001 URINALYSIS AUTO W/SCOPE: CPT

## 2019-04-09 PROCEDURE — 85610 PROTHROMBIN TIME: CPT

## 2019-04-09 PROCEDURE — 85730 THROMBOPLASTIN TIME PARTIAL: CPT

## 2019-04-09 PROCEDURE — 36415 COLL VENOUS BLD VENIPUNCTURE: CPT

## 2019-04-09 PROCEDURE — 80048 BASIC METABOLIC PNL TOTAL CA: CPT

## 2019-04-09 PROCEDURE — 94760 N-INVAS EAR/PLS OXIMETRY 1: CPT

## 2019-04-09 PROCEDURE — 70450 CT HEAD/BRAIN W/O DYE: CPT

## 2019-04-09 PROCEDURE — 99285 EMERGENCY DEPT VISIT HI MDM: CPT

## 2019-04-09 ASSESSMENT — ENCOUNTER SYMPTOMS
EYE PAIN: 0
SHORTNESS OF BREATH: 0
BACK PAIN: 0
SINUS PRESSURE: 0
EYE DISCHARGE: 0
NAUSEA: 0
SORE THROAT: 0
WHEEZING: 0
VOMITING: 0
DIARRHEA: 0
ABDOMINAL DISTENTION: 0
COUGH: 0
EYE REDNESS: 0

## 2019-04-09 ASSESSMENT — PAIN DESCRIPTION - LOCATION: LOCATION: HEAD

## 2019-04-09 ASSESSMENT — PAIN SCALES - GENERAL: PAINLEVEL_OUTOF10: 2

## 2019-04-09 ASSESSMENT — PAIN DESCRIPTION - PAIN TYPE: TYPE: ACUTE PAIN

## 2019-04-09 ASSESSMENT — PAIN DESCRIPTION - FREQUENCY: FREQUENCY: CONTINUOUS

## 2019-04-09 ASSESSMENT — PAIN DESCRIPTION - DESCRIPTORS: DESCRIPTORS: ACHING;HEADACHE

## 2019-04-09 NOTE — ED PROVIDER NOTES
Hematological: Negative for adenopathy. Psychiatric/Behavioral: Positive for confusion. All other systems reviewed and are negative. Physical Exam   Constitutional: She is oriented to person, place, and time. She appears well-developed and well-nourished. No distress. HENT:   Head: Normocephalic and atraumatic. Eyes: Pupils are equal, round, and reactive to light. Conjunctivae and EOM are normal.   Cardiovascular: Normal rate, regular rhythm, normal heart sounds and intact distal pulses. No murmur heard. Pulmonary/Chest: Effort normal and breath sounds normal. No stridor. No respiratory distress. She has no wheezes. She has no rales. Abdominal: Soft. She exhibits no distension and no mass. There is no tenderness. There is no rebound and no guarding. Neurological: She is alert and oriented to person, place, and time. She has normal strength. She displays no atrophy and no tremor. No sensory deficit. GCS eye subscore is 4. GCS verbal subscore is 5. GCS motor subscore is 6. See NIH stroke scale documentation below   Skin: Skin is warm and dry. She is not diaphoretic. Nursing note and vitals reviewed. Procedures    MDM  Number of Diagnoses or Management Options  Altered mental status, unspecified altered mental status type:   Diagnosis management comments: Patient with an NIH of 0 on arrival to the emergency department. There were no waxing or waning neurologic deficits during her stay. Her vital signs remained stable cardiac monitor and she remained at her baseline mental status according to her sister. I do not believe the patient's symptoms today are representative of a acute stroke or a TIA. No evidence of any cardiac or further neurologic explanations for her symptoms today either. Therefore, I believe she is safe for discharge. Return instructions were provided and follow-up with her PCP was encouraged in the next several days. Family understands and is agreeable to this plan. EKG Interpretation    Interpreted by emergency department physician    Clinical Impression: Normal sinus rhythm. No ST segment elevations or depressions. No T-wave abnormalities or inversions. 89 bpm. Old comparison EKG of poor quality. Ryan Cifuentes     NIH Stroke Scale/Score at time of initial evaluation:  1A: Level of Consciousness 0 - alert; keenly responsive   1B: Ask Month and Age 0 - answers both questions correctly   1C: Tell Patient To Open and Close Eyes, then Hand  Squeeze 0 - performs both tasks correctly   2: Test Horizontal Extraocular Movements 0 - normal   3: Test Visual Fields 0 - no visual loss   4: Test Facial Palsy 0 - normal symmetric movement   5A: Test Left Arm Motor Drift 0 - no drift, limb holds 90 (or 45) degrees for full 10 seconds   5B: Test Right Arm Motor Drift 0 - no drift, limb holds 90 (or 45) degrees for full 10 seconds   6A: Test Left Leg Motor Drift 0 - no drift; leg holds 30 degree position for full 5 seconds   6B: Test Right Leg Motor Drift 0 - no drift; leg holds 30 degree position for full 5 seconds   7: Test Limb Ataxia   (FNF/Heel-Shin) 0 - absent   8: Test Sensation 0 - normal; no sensory loss   9: Test Language/Aphasia 0 - no aphasia, normal   10: Test Dysarthria 0 - normal   11: Test Extinction/Inattention 0 - no abnormality   Total Score: 0   4/9/19 at 3:53 PM.         --------------------------------------------- PAST HISTORY ---------------------------------------------  Past Medical History:  has a past medical history of Brain tumor [facility]), Cerebral artery occlusion with cerebral infarction ([facility] [place] [address] Fibromyalgia, GERD (gastroesophageal reflux disease), Hernia of abdominal wall, and Hyperlipidemia. Past Surgical History:  has a past surgical history that includes brain surgery. Social History:  reports that she has never smoked. She has never used smokeless tobacco. She reports that she does not drink alcohol.     Family History: family history is not Specific Gravity, UA <=1.005 1.005 - 1.030    Blood, Urine Negative Negative    pH, UA 5.5 5.0 - 9.0    Protein, UA Negative Negative mg/dL    Urobilinogen, Urine 0.2 <2.0 E.U./dL    Nitrite, Urine Negative Negative    Leukocyte Esterase, Urine Negative Negative    WBC, UA 0-1 0 - 5 /HPF    RBC, UA 0-1 0 - 2 /HPF    Epi Cells FEW /HPF    Bacteria, UA FEW (A) /HPF   EKG 12 Lead   Result Value Ref Range    Ventricular Rate 88 BPM    Atrial Rate 88 BPM    P-R Interval 146 ms    QRS Duration 82 ms    Q-T Interval 360 ms    QTc Calculation (Bazett) 435 ms    P Axis 21 degrees    R Axis 26 degrees    T Axis 17 degrees       Radiology:  CT Head WO Contrast   Final Result   1. There is no acute intracranial hemorrhage or abnormality. 2. Previous right temporal craniotomy with encephalomalacia noted   within the right temporal lobe. XR CHEST 1 VW   Final Result   1. Unremarkable chest.          ------------------------- NURSING NOTES AND VITALS REVIEWED ---------------------------  Date / Time Roomed:  4/9/2019  3:02 PM  ED Bed Assignment:  05/05    The nursing notes within the ED encounter and vital signs as below have been reviewed. /80 Comment: manual  Pulse 86   Temp 97.3 °F (36.3 °C) (Oral)   Resp 16   Ht 5' 5\" (1.651 m)   Wt 228 lb 4 oz (103.5 kg)   LMP 03/31/2019   SpO2 98%   BMI 37.98 kg/m²   Oxygen Saturation Interpretation: Normal      ------------------------------------------ PROGRESS NOTES ------------------------------------------  I have spoken with the patient and discussed todays results, in addition to providing specific details for the plan of care and counseling regarding the diagnosis and prognosis. Their questions are answered at this time and they are agreeable with the plan. I discussed at length with them reasons for immediate return here for re evaluation. They will followup with primary care by calling their office tomorrow.       --------------------------------- ADDITIONAL PROVIDER NOTES ---------------------------------  At this time the patient is without objective evidence of an acute process requiring hospitalization or inpatient management. They have remained hemodynamically stable throughout their entire ED visit and are stable for discharge with outpatient follow-up. The plan has been discussed in detail and they are aware of the specific conditions for emergent return, as well as the importance of follow-up. New Prescriptions    No medications on file       Diagnosis:  1. Altered mental status, unspecified altered mental status type        Disposition:  Patient's disposition: Discharge to home  Patient's condition is stable.          Jayde Singer,   Resident  04/09/19 [id number]

## 2019-04-19 LAB
EKG ATRIAL RATE: 88 BPM
EKG P AXIS: 21 DEGREES
EKG P-R INTERVAL: 146 MS
EKG Q-T INTERVAL: 360 MS
EKG QRS DURATION: 82 MS
EKG QTC CALCULATION (BAZETT): 435 MS
EKG R AXIS: 26 DEGREES
EKG T AXIS: 17 DEGREES
EKG VENTRICULAR RATE: 88 BPM

## 2019-04-22 ENCOUNTER — TELEPHONE (OUTPATIENT)
Dept: AUDIOLOGY | Age: 49
End: 2019-04-22

## 2019-05-06 ENCOUNTER — PROCEDURE VISIT (OUTPATIENT)
Dept: AUDIOLOGY | Age: 49
End: 2019-05-06

## 2019-05-06 DIAGNOSIS — H90.A21 SENSORINEURAL HEARING LOSS, UNILATERAL, RIGHT EAR, WITH RESTRICTED HEARING ON THE CONTRALATERAL SIDE: Primary | ICD-10-CM

## 2019-05-06 PROCEDURE — 99999 PR OFFICE/OUTPT VISIT,PROCEDURE ONLY: CPT | Performed by: AUDIOLOGIST

## 2019-05-07 NOTE — PROGRESS NOTES
Patient seen for hearing aid check and reinstruction in the insertion of the hearing aids, use of the remote control, battery charger and overall use of the hearing aids. Patient is doing fairy well with her hearing aids and notes a significant improvement in her ability to hear conversation. Patient was instructed to NOT use the hearing aids at work [employer]) and to not take the hearing aids to work, at all, for numerous reasons. Patient will return in 2 weeks for a hearing aid check and final decision of keep the hearing aids.

## 2019-05-30 ENCOUNTER — PROCEDURE VISIT (OUTPATIENT)
Dept: AUDIOLOGY | Age: 49
End: 2019-05-30

## 2019-05-30 DIAGNOSIS — H90.A21 SENSORINEURAL HEARING LOSS, UNILATERAL, RIGHT EAR, WITH RESTRICTED HEARING ON THE CONTRALATERAL SIDE: Primary | ICD-10-CM

## 2019-05-30 PROCEDURE — V5140 BEHIND EAR BINAUR HEARING AI: HCPCS | Performed by: AUDIOLOGIST

## 2019-05-30 PROCEDURE — V5160 DISPENSING FEE BINAURAL: HCPCS | Performed by: AUDIOLOGIST

## 2019-05-30 PROCEDURE — MISCAUD MISC AUDIOLOGY SUPPLIES: Performed by: AUDIOLOGIST

## 2019-05-30 NOTE — PROGRESS NOTES
Patient seen for hearing aid check and final decision on hearing aids. Patient has elected to keep the hearing aids and was taken to the [facility]. to pay balance in full ($4585). It was established that patient would be scheduled for bi-monthly hearing aid checks and cleaning, in order to assist patient with maintaining the HAs.

## 2019-07-30 ENCOUNTER — PROCEDURE VISIT (OUTPATIENT)
Dept: AUDIOLOGY | Age: 49
End: 2019-07-30

## 2019-07-30 DIAGNOSIS — H90.A21 SENSORINEURAL HEARING LOSS (SNHL) OF RIGHT EAR WITH RESTRICTED HEARING OF LEFT EAR: Primary | ICD-10-CM

## 2019-07-30 PROCEDURE — 99999 PR OFFICE/OUTPT VISIT,PROCEDURE ONLY: CPT | Performed by: AUDIOLOGIST

## 2019-09-11 ENCOUNTER — OFFICE VISIT (OUTPATIENT)
Dept: ENT CLINIC | Age: 49
End: 2019-09-11
Payer: COMMERCIAL

## 2019-09-11 ENCOUNTER — PROCEDURE VISIT (OUTPATIENT)
Dept: AUDIOLOGY | Age: 49
End: 2019-09-11
Payer: COMMERCIAL

## 2019-09-11 VITALS
HEART RATE: 86 BPM | SYSTOLIC BLOOD PRESSURE: 108 MMHG | DIASTOLIC BLOOD PRESSURE: 74 MMHG | BODY MASS INDEX: 33.45 KG/M2 | HEIGHT: 65 IN | WEIGHT: 200.8 LBS

## 2019-09-11 DIAGNOSIS — H90.3 SENSORINEURAL HEARING LOSS (SNHL) OF BOTH EARS: Primary | ICD-10-CM

## 2019-09-11 PROCEDURE — 99213 OFFICE O/P EST LOW 20 MIN: CPT | Performed by: OTOLARYNGOLOGY

## 2019-09-11 PROCEDURE — 92567 TYMPANOMETRY: CPT | Performed by: AUDIOLOGIST

## 2019-09-11 PROCEDURE — 92557 COMPREHENSIVE HEARING TEST: CPT | Performed by: AUDIOLOGIST

## 2019-09-11 NOTE — PROGRESS NOTES
This patient was referred for audiometric/tympanometric testing by Dr. Ann Meeks due to hearing loss. The patient reported no hearing in her right ear. Audiometry revealed a mild-to-moderate  sensorineural hearing loss, in the left ear. No response was obtained at the limits of the audiometer in the right ear. Reliability was good. Speech reception thresholds were in good agreement with the pure tone averages, in the left ear. Speech discrimination scores were good, in the left ear. Long standing asymmetrical hearing loss, right ear worse. Tympanometry revealed normal middle ear peak pressure and compliance, in the left ear. A flat tympanogram was obtained in the right ear. The results were reviewed with the patient. Recommendations for follow up will be made pending physician consult.     aKren Hinojosa

## 2019-11-05 ENCOUNTER — PROCEDURE VISIT (OUTPATIENT)
Dept: AUDIOLOGY | Age: 49
End: 2019-11-05

## 2019-11-05 DIAGNOSIS — H90.A21 SENSORINEURAL HEARING LOSS, UNILATERAL, RIGHT EAR, WITH RESTRICTED HEARING ON THE CONTRALATERAL SIDE: ICD-10-CM

## 2019-11-05 PROCEDURE — 99024 POSTOP FOLLOW-UP VISIT: CPT | Performed by: AUDIOLOGIST

## 2019-11-08 ENCOUNTER — PROCEDURE VISIT (OUTPATIENT)
Dept: AUDIOLOGY | Age: 49
End: 2019-11-08

## 2019-11-08 DIAGNOSIS — H90.3 SENSORINEURAL HEARING LOSS, BILATERAL: Primary | ICD-10-CM

## 2019-11-08 PROCEDURE — 99024 POSTOP FOLLOW-UP VISIT: CPT | Performed by: AUDIOLOGIST

## 2019-11-11 ENCOUNTER — TELEPHONE (OUTPATIENT)
Dept: AUDIOLOGY | Age: 49
End: 2019-11-11

## 2019-11-25 ENCOUNTER — PROCEDURE VISIT (OUTPATIENT)
Dept: AUDIOLOGY | Age: 49
End: 2019-11-25

## 2019-11-25 DIAGNOSIS — H91.91 UNILATERAL HEARING LOSS, RIGHT: ICD-10-CM

## 2019-11-25 PROCEDURE — 99024 POSTOP FOLLOW-UP VISIT: CPT | Performed by: AUDIOLOGIST

## 2019-12-30 ENCOUNTER — HOSPITAL ENCOUNTER (OUTPATIENT)
Dept: MAMMOGRAPHY | Age: 49
Discharge: HOME | End: 2020-01-01
Payer: COMMERCIAL

## 2019-12-30 PROCEDURE — 77063 BREAST TOMOSYNTHESIS BI: CPT

## 2020-03-11 ENCOUNTER — PROCEDURE VISIT (OUTPATIENT)
Dept: AUDIOLOGY | Age: 50
End: 2020-03-11
Payer: COMMERCIAL

## 2020-03-11 ENCOUNTER — OFFICE VISIT (OUTPATIENT)
Dept: ENT CLINIC | Age: 50
End: 2020-03-11
Payer: COMMERCIAL

## 2020-03-11 VITALS — HEIGHT: 65 IN | WEIGHT: 201 LBS | BODY MASS INDEX: 33.49 KG/M2

## 2020-03-11 PROCEDURE — 99213 OFFICE O/P EST LOW 20 MIN: CPT | Performed by: OTOLARYNGOLOGY

## 2020-03-11 PROCEDURE — 92567 TYMPANOMETRY: CPT | Performed by: AUDIOLOGIST

## 2020-03-11 PROCEDURE — 92552 PURE TONE AUDIOMETRY AIR: CPT | Performed by: AUDIOLOGIST

## 2020-03-11 RX ORDER — AZELASTINE 1 MG/ML
2 SPRAY, METERED NASAL 2 TIMES DAILY
Qty: 1 BOTTLE | Refills: 3 | Status: SHIPPED
Start: 2020-03-11 | End: 2021-03-08

## 2020-03-11 RX ORDER — FLUOXETINE 10 MG/1
10 CAPSULE ORAL DAILY
COMMUNITY

## 2020-03-11 ASSESSMENT — ENCOUNTER SYMPTOMS
SHORTNESS OF BREATH: 0
ABDOMINAL DISTENTION: 0
EYE PAIN: 0
SORE THROAT: 0
NAUSEA: 0
FACIAL SWELLING: 0
CHEST TIGHTNESS: 0
PHOTOPHOBIA: 0
SINUS PRESSURE: 0
SINUS PAIN: 0
VOMITING: 0
ABDOMINAL PAIN: 0
DIARRHEA: 0
EYE REDNESS: 0

## 2020-03-11 NOTE — PROGRESS NOTES
DEPARTMENT OF OTOLARYNGOLOGY   HISTORY AND PHYSICAL      PATIENT: Khalida Rawls : 1970 (62 y.o.)   DATE: 2020  REFERRED BY: No referring provider defined for this encounter. HISTORY OBTAINED FROM:  patient    CHIEF COMPLAINT:  had concerns including Hearing Problem (patient states no changes since last visit). HISTORY OF PRESENT ILLNESS:                                                                                        Khalida Rawls is a(n) 54 y.o. female with significant past medical history of brain tumor s/p resection, right ear deafness, and CVA presents to the office today for follow up. Patient notes no change in left sided hearing. She has been using a hearing aid at home with mild improvement in hearing. She denies any changes to symptoms no ear pain, drainage, new numbness, or weakness. She does note increased rhinorrhea especially at work. She had been on flonase previously but stopped it as she felt she didn't need it. She does not take any other allergy medications. History:   stroke in 2018. Numbness and weakness on the right side of her face from her brain tumor removal about 20 years ago. Past Medical History:   Diagnosis Date    Brain tumor ([address]     23years old-not cancerous     Cerebral artery occlusion with cerebral infarction ([address]     Fibromyalgia     GERD (gastroesophageal reflux disease)     Hernia of abdominal wall     Hyperlipidemia         Past Surgical History:   Procedure Laterality Date    BRAIN SURGERY               Current Outpatient Medications:     FLUoxetine (PROZAC) 10 MG capsule, Take 10 mg by mouth daily, Disp: , Rfl:     aspirin 81 MG tablet, Take 81 mg by mouth daily, Disp: , Rfl:     diazepam (VALIUM) 5 MG tablet, Take 5 mg by mouth every 6 hours as needed for Anxiety. , Disp: , Rfl:     atorvastatin (LIPITOR) 40 MG tablet, TAKE ONE TABLET BY MOUTH EVERY DAY, Disp: , Rfl: 5    clopidogrel (PLAVIX) 75 MG tablet, Respiratory: No increased work of breathing. No stridor or wheezes   Cardiovascular: Regular rate   Skin: Warm and dry   Neurologic:  Alert and oriented                 ASSESSMENTAND PLAN:                                                                                                 Left sided hearing loss    54 y.o. female presents with hearing loss possible secondary to nerve damage    · Continue following with audiology for hearing aid. · Recommend BAHA, patient refused at this time. · Astelin prescribed for her rhinorrhea. Use with her Flonase. · Hearing test- repeat in 1 year or sooner if new or worsening symptoms     If any new or worsening symptoms call the office to be seen sooner, or report to the emergency department, if needed. [facility]  1970      I have discussed the case, including pertinent history and exam findings with the resident. I have seen and examined the patient and the key elements of the encounter have been performed by me. I agree with the assessment, plan and orders as documented by the resident. Patient here for follow up of medical problems. Remainder of medical problems as per resident note.       [facility], DO  3/24/20

## 2020-07-14 ENCOUNTER — HOSPITAL ENCOUNTER (OUTPATIENT)
Dept: MRI IMAGING | Age: 50
Discharge: HOME | End: 2020-07-16
Payer: COMMERCIAL

## 2020-07-14 PROCEDURE — 6360000004 HC RX CONTRAST MEDICATION: Performed by: RADIOLOGY

## 2020-07-14 PROCEDURE — A9579 GAD-BASE MR CONTRAST NOS,1ML: HCPCS | Performed by: RADIOLOGY

## 2020-07-14 PROCEDURE — 70553 MRI BRAIN STEM W/O & W/DYE: CPT

## 2020-07-14 RX ADMIN — GADOTERIDOL 20 ML: 279.3 INJECTION, SOLUTION INTRAVENOUS at 11:07

## 2020-07-20 ENCOUNTER — PROCEDURE VISIT (OUTPATIENT)
Dept: AUDIOLOGY | Age: 50
End: 2020-07-20

## 2020-07-20 PROCEDURE — 99999 PR OFFICE/OUTPT VISIT,PROCEDURE ONLY: CPT | Performed by: AUDIOLOGIST

## 2020-12-15 ENCOUNTER — TELEPHONE (OUTPATIENT)
Dept: AUDIOLOGY | Age: 50
End: 2020-12-15

## 2020-12-31 ENCOUNTER — HOSPITAL ENCOUNTER (OUTPATIENT)
Dept: MAMMOGRAPHY | Age: 50
Discharge: HOME | End: 2021-01-02
Payer: COMMERCIAL

## 2020-12-31 DIAGNOSIS — Z12.31 VISIT FOR SCREENING MAMMOGRAM: ICD-10-CM

## 2020-12-31 PROCEDURE — 77067 SCR MAMMO BI INCL CAD: CPT

## 2021-01-18 ENCOUNTER — TELEPHONE (OUTPATIENT)
Dept: AUDIOLOGY | Age: 51
End: 2021-01-18

## 2021-01-25 ENCOUNTER — PROCEDURE VISIT (OUTPATIENT)
Dept: AUDIOLOGY | Age: 51
End: 2021-01-25

## 2021-01-25 DIAGNOSIS — H90.A21 SENSORINEURAL HEARING LOSS, UNILATERAL, RIGHT EAR, WITH RESTRICTED HEARING ON THE CONTRALATERAL SIDE: Primary | ICD-10-CM

## 2021-01-25 PROCEDURE — 99999 PR OFFICE/OUTPT VISIT,PROCEDURE ONLY: CPT | Performed by: AUDIOLOGIST

## 2021-03-08 ENCOUNTER — OFFICE VISIT (OUTPATIENT)
Dept: ENT CLINIC | Age: 51
End: 2021-03-08
Payer: COMMERCIAL

## 2021-03-08 ENCOUNTER — PROCEDURE VISIT (OUTPATIENT)
Dept: AUDIOLOGY | Age: 51
End: 2021-03-08
Payer: COMMERCIAL

## 2021-03-08 VITALS
BODY MASS INDEX: 34.49 KG/M2 | HEART RATE: 81 BPM | DIASTOLIC BLOOD PRESSURE: 72 MMHG | WEIGHT: 207 LBS | SYSTOLIC BLOOD PRESSURE: 114 MMHG | HEIGHT: 65 IN

## 2021-03-08 DIAGNOSIS — H90.3 SENSORINEURAL HEARING LOSS (SNHL) OF BOTH EARS: ICD-10-CM

## 2021-03-08 DIAGNOSIS — H90.A21 SENSORINEURAL HEARING LOSS, UNILATERAL, RIGHT EAR, WITH RESTRICTED HEARING ON THE CONTRALATERAL SIDE: ICD-10-CM

## 2021-03-08 DIAGNOSIS — H91.92 UNILATERAL HEARING LOSS, LEFT: Primary | ICD-10-CM

## 2021-03-08 PROCEDURE — 92567 TYMPANOMETRY: CPT | Performed by: AUDIOLOGIST

## 2021-03-08 PROCEDURE — 92557 COMPREHENSIVE HEARING TEST: CPT | Performed by: AUDIOLOGIST

## 2021-03-08 PROCEDURE — 99213 OFFICE O/P EST LOW 20 MIN: CPT | Performed by: OTOLARYNGOLOGY

## 2021-03-21 NOTE — PROGRESS NOTES
[id number] [facility] Otolaryngology  Dr. Christin Chowdhury. Ms.Ed        Patient Name:  Jonatan Culp  :  1970     CHIEF C/O:    Chief Complaint   Patient presents with    Hearing Problem     yearly hearing eval  patient states things are ok at this time       HISTORY OBTAINED FROM:  patient    HISTORY OF PRESENT ILLNESS:       Christian Schaffer is a 52y.o. year old female, here today for follow up of patient seen and examined for known history of unilateral right-sided severe hearing loss with known history of left-sided sensorineural hearing loss. Patient denies any new changes in hearing loss, no current complaints of headaches or vision changes, no new complaints of tinnitus or vertigo. No complaints of fever chills or sore throat. Full audiogram was reviewed today with the patient today no significant changes from comparison from the year prior. Past Medical History:   Diagnosis Date    Brain tumor ([facility] [address]     23years old-not cancerous     Cerebral artery occlusion with cerebral infarction ([address]     Fibromyalgia     GERD (gastroesophageal reflux disease)     Hernia of abdominal wall     Hyperlipidemia      Past Surgical History:   Procedure Laterality Date    BRAIN SURGERY             Current Outpatient Medications:     FLUoxetine (PROZAC) 10 MG capsule, Take 10 mg by mouth daily, Disp: , Rfl:     aspirin 81 MG tablet, Take 81 mg by mouth daily, Disp: , Rfl:     diazepam (VALIUM) 5 MG tablet, Take 5 mg by mouth every 6 hours as needed for Anxiety. , Disp: , Rfl:     atorvastatin (LIPITOR) 40 MG tablet, TAKE ONE TABLET BY MOUTH EVERY DAY, Disp: , Rfl: 5    meclizine (ANTIVERT) 25 MG tablet, Take 25 mg by mouth 3 times daily as needed (vertigo), Disp: , Rfl:   Bactrim [sulfamethoxazole-trimethoprim]  Social History     Tobacco Use    Smoking status: Never Smoker    Smokeless tobacco: Never Used   Substance Use Topics    Alcohol use: No    Drug use: Not Currently     History reviewed.  No pertinent family history. Review of Systems    /72   Pulse 81   Ht 5' 5\" (1.651 m)   Wt 207 lb (93.9 kg)   BMI 34.45 kg/m²   Physical Exam          IMPRESSION/PLAN:  Patient with profound total loss of hearing of the right ear, with mild sloping to severe borderline profound left-sided sensorineural loss. Continue hearing aid to left ear as needed, follow-up in 1 year or sooner with any increase in changes of left ear discussed osseointegrated implant, patient will defer at this time. Dr. Apple King.  Otolaryngology Facial Plastic Surgery  :  [facility]  Associate Clinical Professor:  Ravi Layton [facility]

## 2021-09-09 ENCOUNTER — PROCEDURE VISIT (OUTPATIENT)
Dept: AUDIOLOGY | Age: 51
End: 2021-09-09

## 2021-09-09 DIAGNOSIS — H90.3 ASNHL (ASYMMETRICAL SENSORINEURAL HEARING LOSS): ICD-10-CM

## 2021-09-09 PROCEDURE — 99024 POSTOP FOLLOW-UP VISIT: CPT | Performed by: AUDIOLOGIST

## 2022-03-14 ENCOUNTER — OFFICE VISIT (OUTPATIENT)
Dept: ENT CLINIC | Age: 52
End: 2022-03-14
Payer: COMMERCIAL

## 2022-03-14 ENCOUNTER — PROCEDURE VISIT (OUTPATIENT)
Dept: AUDIOLOGY | Age: 52
End: 2022-03-14
Payer: COMMERCIAL

## 2022-03-14 VITALS
HEART RATE: 92 BPM | BODY MASS INDEX: 36.15 KG/M2 | HEIGHT: 65 IN | DIASTOLIC BLOOD PRESSURE: 91 MMHG | SYSTOLIC BLOOD PRESSURE: 137 MMHG | WEIGHT: 217 LBS

## 2022-03-14 DIAGNOSIS — H90.A21 SENSORINEURAL HEARING LOSS, UNILATERAL, RIGHT EAR, WITH RESTRICTED HEARING ON THE CONTRALATERAL SIDE: ICD-10-CM

## 2022-03-14 DIAGNOSIS — H91.92 UNILATERAL HEARING LOSS, LEFT: ICD-10-CM

## 2022-03-14 DIAGNOSIS — H90.3 SENSORINEURAL HEARING LOSS (SNHL) OF BOTH EARS: Primary | ICD-10-CM

## 2022-03-14 PROCEDURE — 92557 COMPREHENSIVE HEARING TEST: CPT | Performed by: AUDIOLOGIST

## 2022-03-14 PROCEDURE — 99214 OFFICE O/P EST MOD 30 MIN: CPT | Performed by: OTOLARYNGOLOGY

## 2022-03-14 PROCEDURE — 92567 TYMPANOMETRY: CPT | Performed by: AUDIOLOGIST

## 2022-03-14 RX ORDER — HYDROXYZINE PAMOATE 25 MG/1
CAPSULE ORAL
COMMUNITY
Start: 2021-12-21

## 2022-03-14 ASSESSMENT — ENCOUNTER SYMPTOMS
WHEEZING: 0
VOICE CHANGE: 0
COLOR CHANGE: 0
SORE THROAT: 0
STRIDOR: 0
COUGH: 0
GASTROINTESTINAL NEGATIVE: 1
TROUBLE SWALLOWING: 0
SINUS PAIN: 0
RHINORRHEA: 0
SINUS PRESSURE: 0
CHOKING: 0

## 2022-03-14 ASSESSMENT — VISUAL ACUITY: OU: 1

## 2022-03-15 NOTE — PROGRESS NOTES
This patient was referred for audiometric/tympanometric testing by Dr. Purvi Robbins. Patient is being seen for her yearly ENT/Audiology consult, due to a unilateral hearing loss, that is longstanding, right ear. Audiometry revealed a moderate-to-moderately-severe sensorineural hearing loss, left ear and a profound hearing loss, right ear. Reliability was fair. Speech reception thresholds were in good agreement with the pure tone averages, in the left ear. Speech discrimination scores were 92%%, left ear at 75dBHL and were unable to be tested, right ear. Tympanometry revealed a flat tympanogram, with no middle ear peak pressure, right ear and normal middle ear peak pressure and compliance, left ear. Ipsilateral acoustic reflexes were absent, bilaterally at 1000Hz. The results were reviewed with the patient. Recommendations for follow up will be made pending physician consult.     Suraj Macario CCC/JUAN ALBERTO  Audiologist  [id number]  NPI#:  [id number]

## 2022-04-11 ENCOUNTER — HOSPITAL ENCOUNTER (OUTPATIENT)
Dept: MAMMOGRAPHY | Age: 52
Discharge: HOME | End: 2022-04-13
Payer: COMMERCIAL

## 2022-04-11 VITALS — HEIGHT: 65 IN | WEIGHT: 217 LBS | BODY MASS INDEX: 36.15 KG/M2

## 2022-04-11 DIAGNOSIS — Z12.31 ENCOUNTER FOR SCREENING MAMMOGRAM FOR MALIGNANT NEOPLASM OF BREAST: ICD-10-CM

## 2022-04-11 PROCEDURE — 77067 SCR MAMMO BI INCL CAD: CPT

## 2023-03-20 ENCOUNTER — HOSPITAL ENCOUNTER (OUTPATIENT)
Dept: MAMMOGRAPHY | Age: 53
Discharge: HOME | End: 2023-03-22
Payer: COMMERCIAL

## 2023-03-20 ENCOUNTER — HOSPITAL ENCOUNTER (OUTPATIENT)
Age: 53
Discharge: HOME | End: 2023-03-22
Payer: COMMERCIAL

## 2023-03-20 DIAGNOSIS — Z12.31 SCREENING MAMMOGRAM, ENCOUNTER FOR: ICD-10-CM

## 2023-03-20 PROCEDURE — 77063 BREAST TOMOSYNTHESIS BI: CPT

## 2023-03-21 ENCOUNTER — OFFICE VISIT (OUTPATIENT)
Dept: ENT CLINIC | Age: 53
End: 2023-03-21
Payer: COMMERCIAL

## 2023-03-21 ENCOUNTER — PROCEDURE VISIT (OUTPATIENT)
Dept: AUDIOLOGY | Age: 53
End: 2023-03-21
Payer: COMMERCIAL

## 2023-03-21 VITALS
BODY MASS INDEX: 48.82 KG/M2 | WEIGHT: 293 LBS | DIASTOLIC BLOOD PRESSURE: 85 MMHG | SYSTOLIC BLOOD PRESSURE: 130 MMHG | HEART RATE: 97 BPM | HEIGHT: 65 IN

## 2023-03-21 DIAGNOSIS — H91.92 UNILATERAL HEARING LOSS, LEFT: Primary | ICD-10-CM

## 2023-03-21 DIAGNOSIS — H90.A21 SENSORINEURAL HEARING LOSS, UNILATERAL, RIGHT EAR, WITH RESTRICTED HEARING ON THE CONTRALATERAL SIDE: Primary | ICD-10-CM

## 2023-03-21 PROCEDURE — 92567 TYMPANOMETRY: CPT | Performed by: AUDIOLOGIST

## 2023-03-21 PROCEDURE — 92557 COMPREHENSIVE HEARING TEST: CPT | Performed by: AUDIOLOGIST

## 2023-03-21 PROCEDURE — 99213 OFFICE O/P EST LOW 20 MIN: CPT | Performed by: OTOLARYNGOLOGY

## 2023-03-21 NOTE — PROGRESS NOTES
This patient was referred for audiometric and tympanometric testing by Dr. Carley Millan due to  established hearing loss . Patient feels as if hearing has gotten worse in the left ear since last hearing test.      Audiometry using pure tone air and bone conduction testing revealed a profound hearing loss, in the right ear and a mild sloping to profound sensorineural hearing loss, in the left ear. Reliability was good. Speech reception thresholds were in good agreement with the pure tone averages, bilaterally. Speech discrimination scores were poor (0%), in the right ear and excellent (100%), in the left ear. Asymmetries noted 250-3000 Hz, right ear worse. Tympanometry revealed normal middle ear pressure and shallow tympanogram, in the right ear, and normal middle ear pressure and compliance, in the left ear. The results were reviewed with the patient. Recommendations for follow up will be made pending physician consult. Patient's hearing aids are not working and charging. Hearing aids being sent in to  for repair. Will call patient when hearing aids return. DOROTA Langley.   Audiology Intern (4th Year)

## 2023-03-21 NOTE — PROGRESS NOTES
Stable HL in left right deaf use HA and follow up in 1 year
(vertigo), Disp: , Rfl:   Bactrim [sulfamethoxazole-trimethoprim]  Social History     Tobacco Use    Smoking status: Never    Smokeless tobacco: Never   Substance Use Topics    Alcohol use: No    Drug use: Not Currently     Family History   Problem Relation Age of Onset    Cancer Mother     Cancer Father     Cancer Maternal Grandmother     Cancer Maternal Grandfather     Cancer Paternal Grandfather        Review of Systems   Constitutional:  Negative for chills and fever. HENT:  Positive for hearing loss. Negative for ear discharge, ear pain, postnasal drip and rhinorrhea. Respiratory:  Negative for cough and shortness of breath. Cardiovascular:  Negative for chest pain and palpitations. Gastrointestinal:  Negative for vomiting. Skin:  Negative for rash. Allergic/Immunologic: Negative for environmental allergies. Neurological:  Negative for dizziness and headaches. Hematological:  Does not bruise/bleed easily. All other systems reviewed and are negative. /85 (Site: Left Upper Arm, Position: Sitting, Cuff Size: Large Adult)   Pulse 97   Ht 5' 5\" (1.651 m)   Wt 298 lb (135.2 kg)   LMP 03/02/2023 (Approximate)   BMI 49.59 kg/m²   Physical Exam  Vitals and nursing note reviewed. Constitutional:       Appearance: She is well-developed. HENT:      Head: Normocephalic and atraumatic. Right Ear: Ear canal and external ear normal.      Left Ear: Ear canal and external ear normal.      Nose: No congestion or rhinorrhea. Mouth/Throat:      Mouth: Mucous membranes are dry. Pharynx: Oropharynx is clear. Eyes:      Conjunctiva/sclera: Conjunctivae normal.      Pupils: Pupils are equal, round, and reactive to light. Cardiovascular:      Rate and Rhythm: Normal rate. Pulmonary:      Effort: Pulmonary effort is normal. No respiratory distress. Breath sounds: Normal breath sounds. Abdominal:      General: There is no distension. Tenderness:  There is no

## 2023-03-21 NOTE — PROGRESS NOTES
I have discussed the case, including pertinent history and exam findings with the audiology extern. I have seen and examined the patient and the key elements of the encounter have been performed by me. I agree with the assessment, plan and orders as documented by the audiology extern.    Frandy Donnelly CCC/JUAN ALBERTO  Audiologist  [id number]  NPI#:  [id number]

## 2023-03-30 ENCOUNTER — TELEPHONE (OUTPATIENT)
Dept: AUDIOLOGY | Age: 53
End: 2023-03-30

## 2023-03-30 ASSESSMENT — ENCOUNTER SYMPTOMS
RHINORRHEA: 0
SHORTNESS OF BREATH: 0
VOMITING: 0
COUGH: 0

## 2023-03-30 NOTE — TELEPHONE ENCOUNTER
Patient's family member called and left a voicemail message. Returned patient message.  Left a voicemail for patient to call back for scheduling    Electronically signed by Reanna Reed on 3/30/2023 at 2:09 PM

## 2023-03-30 NOTE — TELEPHONE ENCOUNTER
At patient's request:  Sister Lois Britton was called to give hearing aid repair/ repair estimate. Repair w/ 6 mo warranty:   $375.00  Repair w/ 12 mo warranty:  $425.00      Left voice mail at sister's number, for estimates.     Best Anderson CCC/JUAN ALBERTO  Audiologist  [id number]  NPI#:  [id number]

## 2023-04-04 ENCOUNTER — TELEPHONE (OUTPATIENT)
Dept: AUDIOLOGY | Age: 53
End: 2023-04-04

## 2023-04-04 NOTE — TELEPHONE ENCOUNTER
Patient's sister approved hearing aid/ repair with 1 year warranty for a total cost of $425.00. Called [employer] to approve repair.     Ganesh Alberts CCC/JUAN ALBERTO  Audiologist  [id number]  NPI#:  [id number]

## 2023-04-17 ENCOUNTER — PROCEDURE VISIT (OUTPATIENT)
Dept: AUDIOLOGY | Age: 53
End: 2023-04-17

## 2023-04-17 DIAGNOSIS — H90.A21 SENSORINEURAL HEARING LOSS, UNILATERAL, RIGHT EAR, WITH RESTRICTED HEARING ON THE CONTRALATERAL SIDE: Primary | ICD-10-CM

## 2023-04-17 PROCEDURE — MISCAUD MISC AUDIOLOGY SUPPLIES: Performed by: AUDIOLOGIST

## 2023-04-18 NOTE — PROGRESS NOTES
Patient seen for hearing aid/ pickup. No issues noted and patient will return in one week, per her request.    Patient paid billing in full, with [employer].     Shantell Schultz CCC/A  Audiologist  [id number]  NPI#:  [id number]

## 2023-05-26 ENCOUNTER — APPOINTMENT (OUTPATIENT)
Dept: GENERAL RADIOLOGY | Age: 53
End: 2023-05-26
Payer: COMMERCIAL

## 2023-05-26 ENCOUNTER — HOSPITAL ENCOUNTER (EMERGENCY)
Age: 53
Discharge: HOME | End: 2023-05-26
Payer: COMMERCIAL

## 2023-05-26 VITALS
OXYGEN SATURATION: 100 % | RESPIRATION RATE: 18 BRPM | HEART RATE: 95 BPM | DIASTOLIC BLOOD PRESSURE: 93 MMHG | SYSTOLIC BLOOD PRESSURE: 141 MMHG | TEMPERATURE: 98.3 F | BODY MASS INDEX: 39.99 KG/M2 | HEIGHT: 65 IN | WEIGHT: 240 LBS

## 2023-05-26 DIAGNOSIS — S46.911A STRAIN OF RIGHT SHOULDER, INITIAL ENCOUNTER: Primary | ICD-10-CM

## 2023-05-26 PROCEDURE — 73030 X-RAY EXAM OF SHOULDER: CPT

## 2023-05-26 PROCEDURE — 99211 OFF/OP EST MAY X REQ PHY/QHP: CPT

## 2023-05-26 RX ORDER — LIDOCAINE 50 MG/G
1 PATCH TOPICAL DAILY
Qty: 10 PATCH | Refills: 0 | Status: SHIPPED | OUTPATIENT
Start: 2023-05-26 | End: 2023-06-05

## 2023-05-26 ASSESSMENT — PAIN SCALES - GENERAL: PAINLEVEL_OUTOF10: 6

## 2023-05-26 ASSESSMENT — PAIN DESCRIPTION - ORIENTATION: ORIENTATION: RIGHT

## 2023-05-26 ASSESSMENT — PAIN - FUNCTIONAL ASSESSMENT: PAIN_FUNCTIONAL_ASSESSMENT: 0-10

## 2023-05-26 ASSESSMENT — PAIN DESCRIPTION - FREQUENCY: FREQUENCY: CONTINUOUS

## 2023-05-26 ASSESSMENT — PAIN DESCRIPTION - LOCATION: LOCATION: SHOULDER

## 2023-05-26 NOTE — ED PROVIDER NOTES
HPI:  5/26/23, Time: 3:28 PM EDT         Suzzanne Mohs is a 48 y.o. female presenting to the ED for right shoulder pain, beginning several hours ago while raising her arm in the air while at work. The complaint has been persistent but overall improving, moderate in severity, and worsened by movement of right shoulder. Patient reports a history of right shoulder pain and neck pain in the past.  No surgical interventions. Reports that the pain is located to the anterior superior aspect of the right shoulder does not radiate. Denies any numbness, tingling, weakness to the right upper extremity. Afebrile without recent travel or sick contacts. Patient denies all other symptoms at this time. Review of Systems:   A complete review of systems was performed and pertinent positives and negatives are stated within HPI, all other systems reviewed and are negative.          --------------------------------------------- PAST HISTORY ---------------------------------------------  Past Medical History:  has a [facility] Fibromyalgia, GERD (gastroesophageal reflux disease), Hernia of abdominal wall, and Hyperlipidemia. Past Surgical History:  has a past surgical history that includes brain surgery. Social History:  reports that she has never smoked. She has never used smokeless tobacco. She reports that she does not currently use drugs. She reports that she does not drink alcohol. Family History: family history includes Cancer in her father, maternal grandfather, maternal grandmother, mother, and paternal grandfather. The patients home medications have been reviewed.     Allergies: Bactrim [sulfamethoxazole-trimethoprim]    -------------------------------------------------- RESULTS -------------------------------------------------  All laboratory and radiology results have been personally reviewed by myself

## 2024-03-11 ENCOUNTER — TELEPHONE (OUTPATIENT)
Dept: AUDIOLOGY | Age: 54
End: 2024-03-11

## 2024-03-11 NOTE — TELEPHONE ENCOUNTER
Patient called and left a voicemail message.   Returned patient message. Left a voicemail at the patient contact number.    Patient needs to change 3/22/24 ENT/Audiology appointments.  Please call:  [phone]    Electronically signed by Reanna German on 3/11/2024 at 4:55 PM

## 2024-03-19 ENCOUNTER — TELEPHONE (OUTPATIENT)
Dept: AUDIOLOGY | Age: 54
End: 2024-03-19

## 2024-03-19 NOTE — TELEPHONE ENCOUNTER
Patient wants to reschedule ENT/Audiology appointments that are on 3/22/24.  Please call patient to reschedule.  [phone]    Elvis Porter CCC/A  Audiologist  [id number]  NPI#:  [id number]

## 2024-03-20 ENCOUNTER — HOSPITAL ENCOUNTER (OUTPATIENT)
Dept: MAMMOGRAPHY | Age: 54
Discharge: HOME | End: 2024-03-22
Attending: FAMILY MEDICINE
Payer: COMMERCIAL

## 2024-03-20 VITALS — BODY MASS INDEX: 40.6 KG/M2 | WEIGHT: 244 LBS

## 2024-03-20 DIAGNOSIS — Z12.31 ENCOUNTER FOR SCREENING MAMMOGRAM FOR MALIGNANT NEOPLASM OF BREAST: ICD-10-CM

## 2024-03-20 PROCEDURE — 77063 BREAST TOMOSYNTHESIS BI: CPT

## 2024-04-16 ENCOUNTER — OFFICE VISIT (OUTPATIENT)
Dept: ENT CLINIC | Age: 54
End: 2024-04-16
Payer: COMMERCIAL

## 2024-04-16 ENCOUNTER — PROCEDURE VISIT (OUTPATIENT)
Dept: AUDIOLOGY | Age: 54
End: 2024-04-16
Payer: COMMERCIAL

## 2024-04-16 VITALS
BODY MASS INDEX: 42.32 KG/M2 | HEIGHT: 65 IN | WEIGHT: 254 LBS | SYSTOLIC BLOOD PRESSURE: 130 MMHG | DIASTOLIC BLOOD PRESSURE: 84 MMHG | HEART RATE: 89 BPM

## 2024-04-16 DIAGNOSIS — H90.A21 SENSORINEURAL HEARING LOSS, UNILATERAL, RIGHT EAR, WITH RESTRICTED HEARING ON THE CONTRALATERAL SIDE: Primary | ICD-10-CM

## 2024-04-16 DIAGNOSIS — H91.92 UNILATERAL HEARING LOSS, LEFT: Primary | ICD-10-CM

## 2024-04-16 PROCEDURE — 92557 COMPREHENSIVE HEARING TEST: CPT | Performed by: AUDIOLOGIST

## 2024-04-16 PROCEDURE — 99213 OFFICE O/P EST LOW 20 MIN: CPT | Performed by: OTOLARYNGOLOGY

## 2024-04-16 PROCEDURE — 92567 TYMPANOMETRY: CPT | Performed by: AUDIOLOGIST

## 2024-04-16 RX ORDER — PANTOPRAZOLE SODIUM 40 MG/1
40 TABLET, DELAYED RELEASE ORAL DAILY
COMMUNITY
Start: 2024-03-29

## 2024-04-16 RX ORDER — PREDNISOLONE ACETATE 10 MG/ML
1 SUSPENSION/ DROPS OPHTHALMIC 3 TIMES DAILY
COMMUNITY
Start: 2024-04-01

## 2024-04-16 NOTE — PROGRESS NOTES
This patient was referred for audiometric and tympanometric testing by Dr. Paniagua.  Patient has a longstanding unilateral hearing loss, right ear.  She is being seen for her yearly ENT/Audiology consult, with no changes to her symptoms.      Audiometry using pure tone air and bone conduction testing revealed a mild-to-severe  sensorineural hearing loss, left ear.  There were no responses, at the limits of the audiometer, right ear.  Reliability was fair. Speech reception thresholds were in good agreement with the pure tone averages, bilaterally. Speech discrimination scores were 88%, left ear at 65dBHL and could not be tested, right ear.     Tympanometry revealed a flat tympanogram, with no middle ear peak pressure, right ear and normal middle ear peak pressure and compliance, left ear.  Ipsilateral acoustic reflexes were absent, right ear and present, left ear at 1000Hz..    The results were reviewed with the patient.     Recommendations for follow up will be made pending physician consult.    Elvis Porter CCC/JUAN ALBERTO  Audiologist  [id number]  NPI#:  [id number]      Electronically signed by Reanna German on 4/16/2024 at 1:43 PM

## 2024-04-16 NOTE — PROGRESS NOTES
Rtr brain tumor reopmval with lpong standing rt dead ear and severe left sided hl    Consider CI    Follow up deandra munozear audio stable in left     [facility]  Dr. Eros Paniagua D.O. Ms.Ed        Patient Name:  Dinah Sanchez  :  1970     CHIEF C/O:    Chief Complaint   Patient presents with    Follow-up     1 year follow up audio patient feels like she has cerumen build up       HISTORY OBTAINED FROM:  patient    HISTORY OF PRESENT ILLNESS:       Dinah is a 53 y.o. year old female, here today for follow up of:       Patient is seen and examined for history of known right-sided complete hearing loss and dead ear, with associated left-sided moderate sloping to profound sensorineural hearing loss.  Patient is currently using left-sided hearing aid, no replacement for right-sided hearing loss that was secondary to a longstanding history of brain tumor and excision in 2017.  She has no left-sided hearing loss no new cases of ear pain or drainage no new cases of vertigo.           Past Medical History:   Diagnosis Date    Brain tumor (HCC)     25 years old-not cancerous     Cerebral artery occlusion with cerebral infarction (HCC)     Fibromyalgia     GERD (gastroesophageal reflux disease)     Hernia of abdominal wall     Hyperlipidemia      Past Surgical History:   Procedure Laterality Date    BRAIN SURGERY             Current Outpatient Medications:     pantoprazole (PROTONIX) 40 MG tablet, Take 1 tablet by mouth daily, Disp: , Rfl:     prednisoLONE acetate (PRED FORTE) 1 % ophthalmic suspension, Place 1 drop into both eyes 3 times daily, Disp: , Rfl:     FLUoxetine (PROZAC) 10 MG capsule, Take 1 capsule by mouth daily, Disp: , Rfl:     aspirin 81 MG tablet, Take 1 tablet by mouth daily, Disp: , Rfl:     atorvastatin (LIPITOR) 40 MG tablet, , Disp: , Rfl: 5    hydrOXYzine (VISTARIL) 25 MG capsule, TAKE ONE CAPSULE BY MOUTH AT BEDTIME (Patient not taking: Reported on 2023), Disp: ,

## 2024-04-18 ASSESSMENT — ENCOUNTER SYMPTOMS
SINUS PAIN: 0
COUGH: 0
VOMITING: 0
VOICE CHANGE: 0
SHORTNESS OF BREATH: 0

## 2025-03-07 ENCOUNTER — TRANSCRIBE ORDERS (OUTPATIENT)
Dept: ADMINISTRATIVE | Age: 55
End: 2025-03-07

## 2025-03-07 DIAGNOSIS — Z86.73 PERSONAL HISTORY OF TIA (TRANSIENT ISCHEMIC ATTACK): Primary | ICD-10-CM

## 2025-03-11 DIAGNOSIS — G47.33 OSA (OBSTRUCTIVE SLEEP APNEA): Primary | ICD-10-CM

## 2025-04-11 ENCOUNTER — HOSPITAL ENCOUNTER (OUTPATIENT)
Dept: CT IMAGING | Age: 55
Discharge: HOME | End: 2025-04-11
Attending: FAMILY MEDICINE
Payer: COMMERCIAL

## 2025-04-11 DIAGNOSIS — Z86.73 PERSONAL HISTORY OF TIA (TRANSIENT ISCHEMIC ATTACK): ICD-10-CM

## 2025-04-11 PROCEDURE — 70450 CT HEAD/BRAIN W/O DYE: CPT

## 2025-04-18 ENCOUNTER — HOSPITAL ENCOUNTER (OUTPATIENT)
Dept: SLEEP CENTER | Age: 55
Discharge: HOME | End: 2025-04-18
Payer: COMMERCIAL

## 2025-04-18 DIAGNOSIS — G47.33 OSA (OBSTRUCTIVE SLEEP APNEA): ICD-10-CM

## 2025-04-18 PROCEDURE — 95810 POLYSOM 6/> YRS 4/> PARAM: CPT

## 2025-04-29 ENCOUNTER — OFFICE VISIT (OUTPATIENT)
Dept: ENT CLINIC | Age: 55
End: 2025-04-29
Payer: COMMERCIAL

## 2025-04-29 ENCOUNTER — PROCEDURE VISIT (OUTPATIENT)
Dept: AUDIOLOGY | Age: 55
End: 2025-04-29
Payer: COMMERCIAL

## 2025-04-29 VITALS
OXYGEN SATURATION: 96 % | BODY MASS INDEX: 42.93 KG/M2 | HEART RATE: 93 BPM | WEIGHT: 258 LBS | DIASTOLIC BLOOD PRESSURE: 88 MMHG | SYSTOLIC BLOOD PRESSURE: 121 MMHG

## 2025-04-29 DIAGNOSIS — H91.91 ACQUIRED DEAFNESS OF RIGHT EAR: ICD-10-CM

## 2025-04-29 DIAGNOSIS — D49.6 BRAIN TUMOR (HCC): ICD-10-CM

## 2025-04-29 DIAGNOSIS — H91.92 UNILATERAL HEARING LOSS, LEFT: Primary | ICD-10-CM

## 2025-04-29 DIAGNOSIS — H90.A21 SENSORINEURAL HEARING LOSS (SNHL) OF RIGHT EAR WITH RESTRICTED HEARING OF LEFT EAR: Primary | ICD-10-CM

## 2025-04-29 DIAGNOSIS — H90.A22 SENSORINEURAL HEARING LOSS (SNHL) OF LEFT EAR WITH RESTRICTED HEARING OF RIGHT EAR: ICD-10-CM

## 2025-04-29 PROCEDURE — 92557 COMPREHENSIVE HEARING TEST: CPT | Performed by: AUDIOLOGIST

## 2025-04-29 PROCEDURE — 92567 TYMPANOMETRY: CPT | Performed by: AUDIOLOGIST

## 2025-04-29 PROCEDURE — 99213 OFFICE O/P EST LOW 20 MIN: CPT | Performed by: OTOLARYNGOLOGY

## 2025-05-01 ASSESSMENT — ENCOUNTER SYMPTOMS
SHORTNESS OF BREATH: 0
RHINORRHEA: 0
SINUS PRESSURE: 0
SINUS PAIN: 0
COUGH: 0
VOMITING: 0

## 2025-05-01 NOTE — PROGRESS NOTES
[facility]  Dr. Eros Paniagua D.O. Ms.Ed        Patient Name:  Dinah Sanchez  :  1970     CHIEF C/O:    Chief Complaint   Patient presents with    Follow-up     Patient states ears are itchy. Patient also states she has questions about cochlear implant        HISTORY OBTAINED FROM:  patient    HISTORY OF PRESENT ILLNESS:       Dinah is a 54 y.o. year old female, here today for follow up of:         History of Present Illness  The patient presents for evaluation of hearing loss.    She reports experiencing difficulty in auditory perception at her workplace, which is characterized by a high level of noise due to the use of clamps and metal objects, as well as the presence of radios and vocal disturbances. She expresses concern about the potential impact of this on her ability to comprehend conversations. She is contemplating the use of a cochlear implant as a possible solution. She has been utilizing a hearing aid in her left ear during her leisure activities such as watching television or when she is at home. However, she refrains from using it at work due to the aforementioned noise levels. Additionally, she mentions occasional instances where the hearing aid appears to be loose, leading her to question if she is inserting it correctly.         Past Medical History:   Diagnosis Date    Brain tumor (HCC)     25 years old-not cancerous     Cerebral artery occlusion with cerebral infarction (HCC)     Fibromyalgia     GERD (gastroesophageal reflux disease)     Hernia of abdominal wall     Hyperlipidemia      Past Surgical History:   Procedure Laterality Date    BRAIN SURGERY           Current Outpatient Medications:   meloxicam (MOBIC) 15 MG tablet, Take 1 tablet by mouth daily, Disp: , Rfl:   pantoprazole (PROTONIX) 40 MG tablet, Take 1 tablet by mouth daily, Disp: , Rfl:   prednisoLONE acetate (PRED FORTE) 1 % ophthalmic suspension, Place 1 drop into both eyes 3 times daily, Disp: , Rfl:

## 2025-05-06 ENCOUNTER — TELEPHONE (OUTPATIENT)
Dept: OTOLARYNGOLOGY | Facility: HOSPITAL | Age: 55
End: 2025-05-06
Payer: COMMERCIAL

## 2025-05-06 NOTE — TELEPHONE ENCOUNTER
Tried to call patient regarding request for cochlear implant evaluation. No answer, no voicemail box set up. Could not leave message.

## 2025-05-07 ENCOUNTER — TELEPHONE (OUTPATIENT)
Dept: OTOLARYNGOLOGY | Facility: HOSPITAL | Age: 55
End: 2025-05-07
Payer: COMMERCIAL

## 2025-05-07 NOTE — TELEPHONE ENCOUNTER
Called patient regarding referral for cochlear implant evaluation. Left message for patient to contact me directly for scheduling.

## 2025-05-08 ENCOUNTER — TELEPHONE (OUTPATIENT)
Dept: OTOLARYNGOLOGY | Facility: HOSPITAL | Age: 55
End: 2025-05-08
Payer: COMMERCIAL

## 2025-05-09 DIAGNOSIS — G47.33 OSA (OBSTRUCTIVE SLEEP APNEA): Primary | ICD-10-CM

## 2025-05-21 ENCOUNTER — TELEPHONE (OUTPATIENT)
Dept: AUDIOLOGY | Age: 55
End: 2025-05-21

## 2025-05-21 NOTE — TELEPHONE ENCOUNTER
Patient called and left a voicemail message.   Tried to call back, however phone number is not working at this time.    Electronically signed by Reanna German on 5/21/2025 at 11:22 AM

## 2025-05-27 ENCOUNTER — TELEPHONE (OUTPATIENT)
Dept: AUDIOLOGY | Age: 55
End: 2025-05-27

## 2025-05-27 NOTE — TELEPHONE ENCOUNTER
Patient called to request ENT/Audiology notes from her last visit.    Gave patient contact information for Medical Records, at [facility].    Elvis Porter CCC/JUAN ALBERTO  Audiologist  [id number]  NPI#:  [id number]

## 2025-07-01 ENCOUNTER — TELEPHONE (OUTPATIENT)
Dept: ENT CLINIC | Age: 55
End: 2025-07-01

## 2025-07-01 NOTE — TELEPHONE ENCOUNTER
Pt was referred to  Dr. Nuno. Referral was faxed to  on 5/1/25. On 5/23/25 called to verify referral was received by , it was. Left messages on pt's VM seeing if pt was interested in getting scheduled with  and if she wasn't interested in doing referral to call office to let us know. Letter sent to pt

## 2025-07-17 ENCOUNTER — OFFICE VISIT (OUTPATIENT)
Dept: SLEEP CENTER | Age: 55
End: 2025-07-17
Payer: COMMERCIAL

## 2025-07-17 VITALS
SYSTOLIC BLOOD PRESSURE: 154 MMHG | HEART RATE: 108 BPM | BODY MASS INDEX: 43.42 KG/M2 | WEIGHT: 260.58 LBS | HEIGHT: 65 IN | DIASTOLIC BLOOD PRESSURE: 79 MMHG | OXYGEN SATURATION: 98 % | TEMPERATURE: 98.2 F

## 2025-07-17 DIAGNOSIS — G47.33 OSA (OBSTRUCTIVE SLEEP APNEA): Primary | ICD-10-CM

## 2025-07-17 DIAGNOSIS — E66.01 MORBID OBESITY (HCC): ICD-10-CM

## 2025-07-17 PROCEDURE — 99204 OFFICE O/P NEW MOD 45 MIN: CPT | Performed by: INTERNAL MEDICINE

## 2025-07-17 RX ORDER — METHYLPREDNISOLONE 4 MG/1
4 TABLET ORAL SEE ADMIN INSTRUCTIONS
COMMUNITY
Start: 2024-10-14

## 2025-07-17 RX ORDER — HYDROCHLOROTHIAZIDE 25 MG/1
25 TABLET ORAL DAILY
COMMUNITY
Start: 2025-01-28

## 2025-07-17 ASSESSMENT — SLEEP AND FATIGUE QUESTIONNAIRES
HOW LIKELY ARE YOU TO NOD OFF OR FALL ASLEEP WHILE SITTING AND TALKING TO SOMEONE: WOULD NEVER DOZE
HOW LIKELY ARE YOU TO NOD OFF OR FALL ASLEEP WHILE SITTING AND READING: SLIGHT CHANCE OF DOZING
HOW LIKELY ARE YOU TO NOD OFF OR FALL ASLEEP WHILE LYING DOWN TO REST IN THE AFTERNOON WHEN CIRCUMSTANCES PERMIT: HIGH CHANCE OF DOZING
HOW LIKELY ARE YOU TO NOD OFF OR FALL ASLEEP WHILE SITTING QUIETLY AFTER LUNCH WITHOUT ALCOHOL: SLIGHT CHANCE OF DOZING
HOW LIKELY ARE YOU TO NOD OFF OR FALL ASLEEP IN A CAR, WHILE STOPPED FOR A FEW MINUTES IN TRAFFIC: WOULD NEVER DOZE
HOW LIKELY ARE YOU TO NOD OFF OR FALL ASLEEP WHEN YOU ARE A PASSENGER IN A CAR FOR AN HOUR WITHOUT A BREAK: SLIGHT CHANCE OF DOZING
HOW LIKELY ARE YOU TO NOD OFF OR FALL ASLEEP WHILE SITTING INACTIVE IN A PUBLIC PLACE: SLIGHT CHANCE OF DOZING
HOW LIKELY ARE YOU TO NOD OFF OR FALL ASLEEP WHILE WATCHING TV: HIGH CHANCE OF DOZING
ESS TOTAL SCORE: 10

## 2025-07-17 ASSESSMENT — ENCOUNTER SYMPTOMS
EYES NEGATIVE: 1
GASTROINTESTINAL NEGATIVE: 1
RESPIRATORY NEGATIVE: 1
ALLERGIC/IMMUNOLOGIC NEGATIVE: 1

## 2025-07-17 NOTE — PROGRESS NOTES
Progress Note    Dinah Sanchez  1970    CC:Sleep Apnea       HPI: 55 year old female, history of snoring, EDS, naps, frequent use of RR during sleep. She underwent PSG, the study showed mild ALIDA. Past history of CVA, affected her speech and cognitive impairment. She had surgery for Benign brain tumor in her 20's.     Past Medical History:   Diagnosis Date    Brain tumor (HCC)     25 years old-not cancerous     Cerebral artery occlusion with cerebral infarction (HCC)     Fibromyalgia     GERD (gastroesophageal reflux disease)     Hernia of abdominal wall     Hyperlipidemia       Past Surgical History:   Procedure Laterality Date    BRAIN SURGERY      1995      Family History   Problem Relation Age of Onset    Cancer Mother     Cancer Father     Cancer Maternal Grandmother     Cancer Maternal Grandfather     Cancer Paternal Grandfather       Social History     Socioeconomic History    Marital status: Single     Spouse name: None    Number of children: None    Years of education: None    Highest education level: None   Tobacco Use    Smoking status: Never    Smokeless tobacco: Never   Substance and Sexual Activity    Alcohol use: No    Drug use: Not Currently        Bactrim [sulfamethoxazole-trimethoprim]     Current Outpatient Medications:     methylPREDNISolone (MEDROL DOSEPACK) 4 MG tablet, Take 1 tablet by mouth See Admin Instructions, Disp: , Rfl:     hydroCHLOROthiazide (HYDRODIURIL) 25 MG tablet, Take 1 tablet by mouth daily, Disp: , Rfl:     pantoprazole (PROTONIX) 40 MG tablet, Take 1 tablet by mouth daily, Disp: , Rfl:     prednisoLONE acetate (PRED FORTE) 1 % ophthalmic suspension, Place 1 drop into both eyes 3 times daily, Disp: , Rfl:     FLUoxetine (PROZAC) 10 MG capsule, Take 1 capsule by mouth daily, Disp: , Rfl:     aspirin 81 MG tablet, Take 1 tablet by mouth daily, Disp: , Rfl:     atorvastatin (LIPITOR) 40 MG tablet, , Disp: , Rfl: 5    meloxicam (MOBIC) 15 MG tablet, Take 1 tablet by mouth